# Patient Record
Sex: MALE | Race: BLACK OR AFRICAN AMERICAN | Employment: FULL TIME | URBAN - METROPOLITAN AREA
[De-identification: names, ages, dates, MRNs, and addresses within clinical notes are randomized per-mention and may not be internally consistent; named-entity substitution may affect disease eponyms.]

---

## 2018-09-13 ENCOUNTER — HOSPITAL ENCOUNTER (EMERGENCY)
Age: 53
Discharge: HOME | End: 2018-09-13
Attending: EMERGENCY MEDICINE
Payer: MEDICAID

## 2018-09-13 ENCOUNTER — APPOINTMENT (OUTPATIENT)
Dept: GENERAL RADIOLOGY | Age: 53
End: 2018-09-13
Payer: MEDICAID

## 2018-09-13 ENCOUNTER — APPOINTMENT (OUTPATIENT)
Dept: CT IMAGING | Age: 53
End: 2018-09-13
Payer: MEDICAID

## 2018-09-13 VITALS
TEMPERATURE: 98 F | SYSTOLIC BLOOD PRESSURE: 110 MMHG | DIASTOLIC BLOOD PRESSURE: 79 MMHG | RESPIRATION RATE: 18 BRPM | OXYGEN SATURATION: 100 % | HEART RATE: 70 BPM

## 2018-09-13 DIAGNOSIS — R07.9 CHEST PAIN, UNSPECIFIED TYPE: Primary | ICD-10-CM

## 2018-09-13 LAB
ANION GAP SERPL CALCULATED.3IONS-SCNC: 12 MMOL/L (ref 3–16)
ANION GAP SERPL CALCULATED.3IONS-SCNC: 7 MMOL/L (ref 3–16)
BASOPHILS ABSOLUTE: 0.1 K/UL (ref 0–0.2)
BASOPHILS RELATIVE PERCENT: 0.7 %
BUN BLDV-MCNC: 17 MG/DL (ref 7–20)
BUN BLDV-MCNC: 17 MG/DL (ref 7–20)
CALCIUM SERPL-MCNC: 9.5 MG/DL (ref 8.3–10.6)
CALCIUM SERPL-MCNC: 9.5 MG/DL (ref 8.3–10.6)
CHLORIDE BLD-SCNC: 100 MMOL/L (ref 99–110)
CHLORIDE BLD-SCNC: 103 MMOL/L (ref 99–110)
CO2: 23 MMOL/L (ref 21–32)
CO2: 25 MMOL/L (ref 21–32)
CREAT SERPL-MCNC: 0.7 MG/DL (ref 0.9–1.3)
CREAT SERPL-MCNC: 0.8 MG/DL (ref 0.9–1.3)
EOSINOPHILS ABSOLUTE: 0.1 K/UL (ref 0–0.6)
EOSINOPHILS RELATIVE PERCENT: 1 %
GFR AFRICAN AMERICAN: >60
GFR AFRICAN AMERICAN: >60
GFR NON-AFRICAN AMERICAN: >60
GFR NON-AFRICAN AMERICAN: >60
GLUCOSE BLD-MCNC: 107 MG/DL (ref 70–99)
GLUCOSE BLD-MCNC: 97 MG/DL (ref 70–99)
HCT VFR BLD CALC: 47.4 % (ref 40.5–52.5)
HEMOGLOBIN: 15.9 G/DL (ref 13.5–17.5)
LYMPHOCYTES ABSOLUTE: 2.7 K/UL (ref 1–5.1)
LYMPHOCYTES RELATIVE PERCENT: 21.9 %
MCH RBC QN AUTO: 31 PG (ref 26–34)
MCHC RBC AUTO-ENTMCNC: 33.5 G/DL (ref 31–36)
MCV RBC AUTO: 92.7 FL (ref 80–100)
MONOCYTES ABSOLUTE: 0.9 K/UL (ref 0–1.3)
MONOCYTES RELATIVE PERCENT: 7.4 %
NEUTROPHILS ABSOLUTE: 8.5 K/UL (ref 1.7–7.7)
NEUTROPHILS RELATIVE PERCENT: 69 %
PDW BLD-RTO: 14.4 % (ref 12.4–15.4)
PLATELET # BLD: 251 K/UL (ref 135–450)
PMV BLD AUTO: 8.1 FL (ref 5–10.5)
POTASSIUM REFLEX MAGNESIUM: >10 MMOL/L (ref 3.5–5.1)
POTASSIUM SERPL-SCNC: 5.3 MMOL/L (ref 3.5–5.1)
PRO-BNP: 19 PG/ML (ref 0–124)
RBC # BLD: 5.12 M/UL (ref 4.2–5.9)
SODIUM BLD-SCNC: 132 MMOL/L (ref 136–145)
SODIUM BLD-SCNC: 138 MMOL/L (ref 136–145)
TROPONIN: <0.01 NG/ML
TROPONIN: <0.01 NG/ML
WBC # BLD: 12.3 K/UL (ref 4–11)

## 2018-09-13 PROCEDURE — 99285 EMERGENCY DEPT VISIT HI MDM: CPT

## 2018-09-13 PROCEDURE — 96374 THER/PROPH/DIAG INJ IV PUSH: CPT

## 2018-09-13 PROCEDURE — 93005 ELECTROCARDIOGRAM TRACING: CPT | Performed by: EMERGENCY MEDICINE

## 2018-09-13 PROCEDURE — 93971 EXTREMITY STUDY: CPT

## 2018-09-13 PROCEDURE — 71275 CT ANGIOGRAPHY CHEST: CPT

## 2018-09-13 PROCEDURE — 83880 ASSAY OF NATRIURETIC PEPTIDE: CPT

## 2018-09-13 PROCEDURE — 71046 X-RAY EXAM CHEST 2 VIEWS: CPT

## 2018-09-13 PROCEDURE — 85025 COMPLETE CBC W/AUTO DIFF WBC: CPT

## 2018-09-13 PROCEDURE — 80048 BASIC METABOLIC PNL TOTAL CA: CPT

## 2018-09-13 PROCEDURE — 6360000004 HC RX CONTRAST MEDICATION: Performed by: EMERGENCY MEDICINE

## 2018-09-13 PROCEDURE — 84484 ASSAY OF TROPONIN QUANT: CPT

## 2018-09-13 PROCEDURE — 6360000002 HC RX W HCPCS: Performed by: PHYSICIAN ASSISTANT

## 2018-09-13 PROCEDURE — 6370000000 HC RX 637 (ALT 250 FOR IP): Performed by: PHYSICIAN ASSISTANT

## 2018-09-13 RX ORDER — LITHIUM CARBONATE 300 MG/1
300 CAPSULE ORAL
COMMUNITY
End: 2020-05-09 | Stop reason: ALTCHOICE

## 2018-09-13 RX ORDER — RISPERIDONE 2 MG/1
2 TABLET, FILM COATED ORAL NIGHTLY
COMMUNITY
End: 2020-05-09 | Stop reason: ALTCHOICE

## 2018-09-13 RX ORDER — ASPIRIN 81 MG/1
324 TABLET, CHEWABLE ORAL ONCE
Status: COMPLETED | OUTPATIENT
Start: 2018-09-13 | End: 2018-09-13

## 2018-09-13 RX ORDER — MORPHINE SULFATE 4 MG/ML
4 INJECTION, SOLUTION INTRAMUSCULAR; INTRAVENOUS ONCE
Status: COMPLETED | OUTPATIENT
Start: 2018-09-13 | End: 2018-09-13

## 2018-09-13 RX ORDER — GABAPENTIN 800 MG/1
800 TABLET ORAL 3 TIMES DAILY
COMMUNITY

## 2018-09-13 RX ORDER — NITROGLYCERIN 0.4 MG/1
0.4 TABLET SUBLINGUAL EVERY 5 MIN PRN
Status: DISCONTINUED | OUTPATIENT
Start: 2018-09-13 | End: 2018-09-13 | Stop reason: HOSPADM

## 2018-09-13 RX ADMIN — ASPIRIN 81 MG 324 MG: 81 TABLET ORAL at 10:17

## 2018-09-13 RX ADMIN — MORPHINE SULFATE 4 MG: 4 INJECTION INTRAVENOUS at 12:17

## 2018-09-13 RX ADMIN — IOPAMIDOL 80 ML: 755 INJECTION, SOLUTION INTRAVENOUS at 13:37

## 2018-09-13 RX ADMIN — NITROGLYCERIN 0.4 MG: 0.4 TABLET, ORALLY DISINTEGRATING SUBLINGUAL at 10:17

## 2018-09-13 ASSESSMENT — PAIN SCALES - GENERAL: PAINLEVEL_OUTOF10: 7

## 2018-09-13 ASSESSMENT — PAIN DESCRIPTION - FREQUENCY: FREQUENCY: CONTINUOUS

## 2018-09-13 ASSESSMENT — ENCOUNTER SYMPTOMS
COUGH: 0
ABDOMINAL PAIN: 0
VOMITING: 0
EYE REDNESS: 0
SHORTNESS OF BREATH: 1
NAUSEA: 0

## 2018-09-13 ASSESSMENT — HEART SCORE: ECG: 0

## 2018-09-13 ASSESSMENT — PAIN DESCRIPTION - DESCRIPTORS: DESCRIPTORS: SQUEEZING

## 2018-09-13 ASSESSMENT — PAIN DESCRIPTION - PAIN TYPE: TYPE: ACUTE PAIN

## 2018-09-13 ASSESSMENT — PAIN DESCRIPTION - LOCATION: LOCATION: CHEST

## 2018-09-13 NOTE — ED PROVIDER NOTES
Potassium 5.3 (H) 3.5 - 5.1 mmol/L    Chloride 103 99 - 110 mmol/L    CO2 23 21 - 32 mmol/L    Anion Gap 12 3 - 16    Glucose 97 70 - 99 mg/dL    BUN 17 7 - 20 mg/dL    CREATININE 0.7 (L) 0.9 - 1.3 mg/dL    GFR Non-African American >60 >60    GFR African American >60 >60    Calcium 9.5 8.3 - 10.6 mg/dL   Troponin   Result Value Ref Range    Troponin <0.01 <0.01 ng/mL   EKG 12 Lead   Result Value Ref Range    Ventricular Rate 77 BPM    Atrial Rate 77 BPM    P-R Interval 152 ms    QRS Duration 98 ms    Q-T Interval 396 ms    QTc Calculation (Bazett) 448 ms    P Axis 51 degrees    R Axis -39 degrees    T Axis 61 degrees    Diagnosis       EKG performed in ER and to be interpreted by ER physician. Confirmed by MD, ER ([id number]),  Shonda Estella ([id number]) on 9/13/2018 10:46:17 AM       RECENT VITALS:  BP: 110/79, Temp: 98 °F (36.7 °C), Pulse: 70, Resp: 18, SpO2: 100 %       ED Course     Nursing Notes, Past Medical Hx, Past Surgical Hx, Social Hx, Allergies, and Family Hx were reviewed. The patient was given the following medications:  Orders Placed This Encounter   Medications    aspirin chewable tablet 324 mg    nitroGLYCERIN (NITROSTAT) SL tablet 0.4 mg    morphine (PF) injection 4 mg    iopamidol (ISOVUE-370) 76 % injection 80 mL       CONSULTS:  KALANI Oneil 55 / ASSESSMENT / Oly Brownevan is a 47 y.o. male with PMH of CAD, Seizure disorder, Tobacco Use who presents with Chest pain. Patient reports constant left-sided chest pressure for the past 4 days. He reports associated shortness of breath, diaphoresis. He also reports left foot surgery by podiatry in May 2018. He reports a \"knot\" in his left lower leg for which he is scheduled for a venous duplex 9/18. Physical exam reveals a 80-year-old male in no acute distress. Heart rhythm and rate are regular without murmurs. Lungs clear to auscultation bilaterally. Chest is tender to palpation. Abdomen is soft and nontender. No calf tenderness. Mild edema to left foot. Patient does have palpable nodule to distal aspect of lateral left shin. This is not consistent with classic ACS symptoms. Heart score 2-3 (given age, risk factors). He does have pleuritic chest pain, tachycardia, recent surgery and immbolization and, therefore, PE workup pursued. Patient given 324mg ASA and nitro without relief. He is given IV morphine. EKG obtained shows normal sinus rhythm, left axis deviation without acute ST wave changes. IV access was established. Labs including CBC, BMP, Trop, BNP were obtained. Labs revealed wbc 12.3, creatinine 0.8. Negative troponin. BNP 19.    Chest Xray showed no acute abnormality. CT PA showed no PE. Ground-glass opacities may be secondary to underlying infection. However, patient has no cough, no fever. Ultrasound left lower extremity showed There is acute totally occluding superficial venous thrombosis involving the greater saphenous vein from the knee to the proximal to mid calf area. Case was discussed with on call provider for patient's PCP [facility]. Patient has superficial venous thrombosis; therefore, we will not anticoagulate. Patient should have follow-up ultrasound in 2 weeks and he will be instructed on taking aspirin daily. Additionally, he is instructed to follow-up with his PCP for outpatient stress testing. Patient discharged home with above instructions. He is given strict return precautions. This patient was also evaluated by the attending physician. All care plans were discussed and agreed upon. Clinical Impression     1.  Chest pain, unspecified type        Disposition     PATIENT REFERRED TO:  The [address] [place] [address] Emergency Department  [address] [place] [address]  [phone]    If symptoms worsen      DISCHARGE MEDICATIONS:  Discharge Medication List as of 9/13/2018  4:00 PM          DISPOSITION  Decision to discharge Jose Oakes PA-C  09/13/18 [id number]

## 2018-09-25 LAB
EKG ATRIAL RATE: 77 BPM
EKG DIAGNOSIS: NORMAL
EKG P AXIS: 51 DEGREES
EKG P-R INTERVAL: 152 MS
EKG Q-T INTERVAL: 396 MS
EKG QRS DURATION: 98 MS
EKG QTC CALCULATION (BAZETT): 448 MS
EKG R AXIS: -39 DEGREES
EKG T AXIS: 61 DEGREES
EKG VENTRICULAR RATE: 77 BPM

## 2020-05-09 ENCOUNTER — APPOINTMENT (OUTPATIENT)
Dept: CT IMAGING | Age: 55
DRG: 053 | End: 2020-05-09
Payer: MEDICAID

## 2020-05-09 ENCOUNTER — APPOINTMENT (OUTPATIENT)
Dept: GENERAL RADIOLOGY | Age: 55
DRG: 053 | End: 2020-05-09
Payer: MEDICAID

## 2020-05-09 ENCOUNTER — HOSPITAL ENCOUNTER (INPATIENT)
Age: 55
LOS: 3 days | Discharge: HOME | DRG: 053 | End: 2020-05-12
Attending: EMERGENCY MEDICINE | Admitting: INTERNAL MEDICINE
Payer: MEDICAID

## 2020-05-09 ENCOUNTER — APPOINTMENT (OUTPATIENT)
Dept: MRI IMAGING | Age: 55
DRG: 053 | End: 2020-05-09
Payer: MEDICAID

## 2020-05-09 PROBLEM — R56.9 SEIZURE (HCC): Status: ACTIVE | Noted: 2020-05-09

## 2020-05-09 LAB
AMPHETAMINE SCREEN, URINE: ABNORMAL
ANION GAP SERPL CALCULATED.3IONS-SCNC: 12 MMOL/L (ref 3–16)
ANION GAP SERPL CALCULATED.3IONS-SCNC: 16 MMOL/L (ref 3–16)
BARBITURATE SCREEN URINE: ABNORMAL
BASE EXCESS VENOUS: 0.1 MMOL/L (ref -2–3)
BASOPHILS ABSOLUTE: 0 K/UL (ref 0–0.2)
BASOPHILS RELATIVE PERCENT: 0.5 %
BENZODIAZEPINE SCREEN, URINE: ABNORMAL
BILIRUBIN URINE: NEGATIVE
BLOOD, URINE: NEGATIVE
BUN BLDV-MCNC: 15 MG/DL (ref 7–20)
BUN BLDV-MCNC: 16 MG/DL (ref 7–20)
CALCIUM SERPL-MCNC: 9.1 MG/DL (ref 8.3–10.6)
CALCIUM SERPL-MCNC: 9.5 MG/DL (ref 8.3–10.6)
CANNABINOID SCREEN URINE: POSITIVE
CARBOXYHEMOGLOBIN: 5.4 % (ref 0–1.5)
CHLORIDE BLD-SCNC: 102 MMOL/L (ref 99–110)
CHLORIDE BLD-SCNC: 105 MMOL/L (ref 99–110)
CLARITY: CLEAR
CO2: 22 MMOL/L (ref 21–32)
CO2: 23 MMOL/L (ref 21–32)
COCAINE METABOLITE SCREEN URINE: ABNORMAL
COLOR: YELLOW
CREAT SERPL-MCNC: 0.6 MG/DL (ref 0.9–1.3)
CREAT SERPL-MCNC: 0.7 MG/DL (ref 0.9–1.3)
EKG ATRIAL RATE: 65 BPM
EKG DIAGNOSIS: NORMAL
EKG P AXIS: 50 DEGREES
EKG P-R INTERVAL: 160 MS
EKG Q-T INTERVAL: 410 MS
EKG QRS DURATION: 98 MS
EKG QTC CALCULATION (BAZETT): 426 MS
EKG R AXIS: -24 DEGREES
EKG T AXIS: 54 DEGREES
EKG VENTRICULAR RATE: 65 BPM
EOSINOPHILS ABSOLUTE: 0.1 K/UL (ref 0–0.6)
EOSINOPHILS RELATIVE PERCENT: 0.5 %
ESTIMATED AVERAGE GLUCOSE: 119.8 MG/DL
ETHANOL: NORMAL MG/DL (ref 0–0.08)
GFR AFRICAN AMERICAN: >60
GFR AFRICAN AMERICAN: >60
GFR NON-AFRICAN AMERICAN: >60
GFR NON-AFRICAN AMERICAN: >60
GLUCOSE BLD-MCNC: 100 MG/DL (ref 70–99)
GLUCOSE BLD-MCNC: 113 MG/DL (ref 70–99)
GLUCOSE URINE: NEGATIVE MG/DL
HBA1C MFR BLD: 5.8 %
HCO3 VENOUS: 24.8 MMOL/L (ref 24–28)
HCT VFR BLD CALC: 45.2 % (ref 40.5–52.5)
HCT VFR BLD CALC: 45.4 % (ref 40.5–52.5)
HEMOGLOBIN, VEN, REDUCED: 7.2 %
HEMOGLOBIN: 15.3 G/DL (ref 13.5–17.5)
HEMOGLOBIN: 15.6 G/DL (ref 13.5–17.5)
KETONES, URINE: ABNORMAL MG/DL
LACTIC ACID, SEPSIS: 1.6 MMOL/L (ref 0.4–1.9)
LACTIC ACID: 0.9 MMOL/L (ref 0.4–2)
LEUKOCYTE ESTERASE, URINE: NEGATIVE
LYMPHOCYTES ABSOLUTE: 1.6 K/UL (ref 1–5.1)
LYMPHOCYTES RELATIVE PERCENT: 16.8 %
Lab: ABNORMAL
MCH RBC QN AUTO: 31.8 PG (ref 26–34)
MCH RBC QN AUTO: 31.8 PG (ref 26–34)
MCHC RBC AUTO-ENTMCNC: 33.9 G/DL (ref 31–36)
MCHC RBC AUTO-ENTMCNC: 34.3 G/DL (ref 31–36)
MCV RBC AUTO: 92.7 FL (ref 80–100)
MCV RBC AUTO: 94 FL (ref 80–100)
METHADONE SCREEN, URINE: ABNORMAL
METHEMOGLOBIN VENOUS: 0.4 % (ref 0–1.5)
MICROSCOPIC EXAMINATION: ABNORMAL
MONOCYTES ABSOLUTE: 0.7 K/UL (ref 0–1.3)
MONOCYTES RELATIVE PERCENT: 7.6 %
NEUTROPHILS ABSOLUTE: 7.1 K/UL (ref 1.7–7.7)
NEUTROPHILS RELATIVE PERCENT: 74.6 %
NITRITE, URINE: NEGATIVE
O2 SAT, VEN: 92 %
OPIATE SCREEN URINE: ABNORMAL
OXYCODONE URINE: ABNORMAL
PCO2, VEN: 42.4 MMHG (ref 41–51)
PDW BLD-RTO: 14.1 % (ref 12.4–15.4)
PDW BLD-RTO: 14.2 % (ref 12.4–15.4)
PH UA: 6
PH UA: 6 (ref 5–8)
PH VENOUS: 7.38 (ref 7.35–7.45)
PHENCYCLIDINE SCREEN URINE: ABNORMAL
PHENYTOIN DOSE AMOUNT: ABNORMAL
PHENYTOIN LEVEL: 24.4 UG/ML (ref 10–20)
PLATELET # BLD: 193 K/UL (ref 135–450)
PLATELET # BLD: 217 K/UL (ref 135–450)
PMV BLD AUTO: 7.7 FL (ref 5–10.5)
PMV BLD AUTO: 8.2 FL (ref 5–10.5)
PO2, VEN: 73.4 MMHG (ref 25–40)
POTASSIUM REFLEX MAGNESIUM: 3.8 MMOL/L (ref 3.5–5.1)
POTASSIUM REFLEX MAGNESIUM: 4.1 MMOL/L (ref 3.5–5.1)
PROPOXYPHENE SCREEN: ABNORMAL
PROTEIN UA: NEGATIVE MG/DL
RBC # BLD: 4.81 M/UL (ref 4.2–5.9)
RBC # BLD: 4.9 M/UL (ref 4.2–5.9)
SODIUM BLD-SCNC: 139 MMOL/L (ref 136–145)
SODIUM BLD-SCNC: 141 MMOL/L (ref 136–145)
SPECIFIC GRAVITY UA: >=1.03 (ref 1–1.03)
TCO2 CALC VENOUS: 26 MMOL/L
TOTAL CK: 399 U/L (ref 39–308)
TROPONIN: <0.01 NG/ML
TSH REFLEX: 1.97 UIU/ML (ref 0.27–4.2)
URINE TYPE: ABNORMAL
UROBILINOGEN, URINE: 0.2 E.U./DL
WBC # BLD: 9.4 K/UL (ref 4–11)
WBC # BLD: 9.6 K/UL (ref 4–11)

## 2020-05-09 PROCEDURE — 6360000002 HC RX W HCPCS

## 2020-05-09 PROCEDURE — 70553 MRI BRAIN STEM W/O & W/DYE: CPT

## 2020-05-09 PROCEDURE — 84484 ASSAY OF TROPONIN QUANT: CPT

## 2020-05-09 PROCEDURE — 83036 HEMOGLOBIN GLYCOSYLATED A1C: CPT

## 2020-05-09 PROCEDURE — 81003 URINALYSIS AUTO W/O SCOPE: CPT

## 2020-05-09 PROCEDURE — 99285 EMERGENCY DEPT VISIT HI MDM: CPT

## 2020-05-09 PROCEDURE — 2580000003 HC RX 258: Performed by: STUDENT IN AN ORGANIZED HEALTH CARE EDUCATION/TRAINING PROGRAM

## 2020-05-09 PROCEDURE — 72125 CT NECK SPINE W/O DYE: CPT

## 2020-05-09 PROCEDURE — 70450 CT HEAD/BRAIN W/O DYE: CPT

## 2020-05-09 PROCEDURE — 2580000003 HC RX 258: Performed by: PHYSICIAN ASSISTANT

## 2020-05-09 PROCEDURE — 96374 THER/PROPH/DIAG INJ IV PUSH: CPT

## 2020-05-09 PROCEDURE — 93005 ELECTROCARDIOGRAM TRACING: CPT | Performed by: STUDENT IN AN ORGANIZED HEALTH CARE EDUCATION/TRAINING PROGRAM

## 2020-05-09 PROCEDURE — 6360000002 HC RX W HCPCS: Performed by: STUDENT IN AN ORGANIZED HEALTH CARE EDUCATION/TRAINING PROGRAM

## 2020-05-09 PROCEDURE — 80307 DRUG TEST PRSMV CHEM ANLYZR: CPT

## 2020-05-09 PROCEDURE — 6360000004 HC RX CONTRAST MEDICATION: Performed by: STUDENT IN AN ORGANIZED HEALTH CARE EDUCATION/TRAINING PROGRAM

## 2020-05-09 PROCEDURE — 82803 BLOOD GASES ANY COMBINATION: CPT

## 2020-05-09 PROCEDURE — 71046 X-RAY EXAM CHEST 2 VIEWS: CPT

## 2020-05-09 PROCEDURE — 82550 ASSAY OF CK (CPK): CPT

## 2020-05-09 PROCEDURE — G0480 DRUG TEST DEF 1-7 CLASSES: HCPCS

## 2020-05-09 PROCEDURE — 6370000000 HC RX 637 (ALT 250 FOR IP): Performed by: STUDENT IN AN ORGANIZED HEALTH CARE EDUCATION/TRAINING PROGRAM

## 2020-05-09 PROCEDURE — 2060000000 HC ICU INTERMEDIATE R&B

## 2020-05-09 PROCEDURE — 0HQ1XZZ REPAIR FACE SKIN, EXTERNAL APPROACH: ICD-10-PCS | Performed by: PHYSICIAN ASSISTANT

## 2020-05-09 PROCEDURE — 80048 BASIC METABOLIC PNL TOTAL CA: CPT

## 2020-05-09 PROCEDURE — 85027 COMPLETE CBC AUTOMATED: CPT

## 2020-05-09 PROCEDURE — 73030 X-RAY EXAM OF SHOULDER: CPT

## 2020-05-09 PROCEDURE — 96375 TX/PRO/DX INJ NEW DRUG ADDON: CPT

## 2020-05-09 PROCEDURE — 83605 ASSAY OF LACTIC ACID: CPT

## 2020-05-09 PROCEDURE — A9579 GAD-BASE MR CONTRAST NOS,1ML: HCPCS | Performed by: STUDENT IN AN ORGANIZED HEALTH CARE EDUCATION/TRAINING PROGRAM

## 2020-05-09 PROCEDURE — 6360000002 HC RX W HCPCS: Performed by: PHYSICIAN ASSISTANT

## 2020-05-09 PROCEDURE — 6370000000 HC RX 637 (ALT 250 FOR IP): Performed by: PHYSICIAN ASSISTANT

## 2020-05-09 PROCEDURE — 12011 RPR F/E/E/N/L/M 2.5 CM/<: CPT

## 2020-05-09 PROCEDURE — 80185 ASSAY OF PHENYTOIN TOTAL: CPT

## 2020-05-09 PROCEDURE — 36415 COLL VENOUS BLD VENIPUNCTURE: CPT

## 2020-05-09 PROCEDURE — 85025 COMPLETE CBC W/AUTO DIFF WBC: CPT

## 2020-05-09 PROCEDURE — 2500000003 HC RX 250 WO HCPCS: Performed by: PHYSICIAN ASSISTANT

## 2020-05-09 PROCEDURE — 71045 X-RAY EXAM CHEST 1 VIEW: CPT

## 2020-05-09 PROCEDURE — 84443 ASSAY THYROID STIM HORMONE: CPT

## 2020-05-09 PROCEDURE — 95813 EEG EXTND MNTR 61-119 MIN: CPT

## 2020-05-09 PROCEDURE — 93005 ELECTROCARDIOGRAM TRACING: CPT | Performed by: PHYSICIAN ASSISTANT

## 2020-05-09 RX ORDER — ACETAMINOPHEN 325 MG/1
650 TABLET ORAL EVERY 6 HOURS PRN
Status: DISCONTINUED | OUTPATIENT
Start: 2020-05-09 | End: 2020-05-12 | Stop reason: HOSPADM

## 2020-05-09 RX ORDER — ASPIRIN 81 MG/1
81 TABLET, CHEWABLE ORAL DAILY
Status: DISCONTINUED | OUTPATIENT
Start: 2020-05-09 | End: 2020-05-12 | Stop reason: HOSPADM

## 2020-05-09 RX ORDER — OLANZAPINE 10 MG/1
10 TABLET ORAL NIGHTLY
Status: ON HOLD | COMMUNITY
End: 2020-05-11 | Stop reason: ALTCHOICE

## 2020-05-09 RX ORDER — SODIUM CHLORIDE 0.9 % (FLUSH) 0.9 %
10 SYRINGE (ML) INJECTION PRN
Status: DISCONTINUED | OUTPATIENT
Start: 2020-05-09 | End: 2020-05-12 | Stop reason: HOSPADM

## 2020-05-09 RX ORDER — 0.9 % SODIUM CHLORIDE 0.9 %
500 INTRAVENOUS SOLUTION INTRAVENOUS ONCE
Status: COMPLETED | OUTPATIENT
Start: 2020-05-09 | End: 2020-05-09

## 2020-05-09 RX ORDER — PROMETHAZINE HYDROCHLORIDE 12.5 MG/1
12.5 TABLET ORAL EVERY 6 HOURS PRN
Status: DISCONTINUED | OUTPATIENT
Start: 2020-05-09 | End: 2020-05-12 | Stop reason: HOSPADM

## 2020-05-09 RX ORDER — SODIUM CHLORIDE 9 MG/ML
INJECTION, SOLUTION INTRAVENOUS CONTINUOUS
Status: DISCONTINUED | OUTPATIENT
Start: 2020-05-09 | End: 2020-05-10

## 2020-05-09 RX ORDER — LORAZEPAM 2 MG/ML
2 INJECTION INTRAMUSCULAR EVERY 5 MIN PRN
Status: DISCONTINUED | OUTPATIENT
Start: 2020-05-09 | End: 2020-05-12 | Stop reason: HOSPADM

## 2020-05-09 RX ORDER — RISPERIDONE 2 MG/1
2 TABLET, FILM COATED ORAL DAILY
Status: ON HOLD | COMMUNITY
End: 2020-05-11 | Stop reason: ALTCHOICE

## 2020-05-09 RX ORDER — HYDROCODONE BITARTRATE AND ACETAMINOPHEN 5; 325 MG/1; MG/1
1 TABLET ORAL ONCE
Status: DISCONTINUED | OUTPATIENT
Start: 2020-05-09 | End: 2020-05-09

## 2020-05-09 RX ORDER — LORAZEPAM 2 MG/ML
INJECTION INTRAMUSCULAR
Status: DISPENSED
Start: 2020-05-09 | End: 2020-05-09

## 2020-05-09 RX ORDER — POLYETHYLENE GLYCOL 3350 17 G/17G
17 POWDER, FOR SOLUTION ORAL DAILY PRN
Status: DISCONTINUED | OUTPATIENT
Start: 2020-05-09 | End: 2020-05-12 | Stop reason: HOSPADM

## 2020-05-09 RX ORDER — DIPHENHYDRAMINE HYDROCHLORIDE 50 MG/ML
25 INJECTION INTRAMUSCULAR; INTRAVENOUS ONCE
Status: COMPLETED | OUTPATIENT
Start: 2020-05-09 | End: 2020-05-09

## 2020-05-09 RX ORDER — SODIUM CHLORIDE 0.9 % (FLUSH) 0.9 %
10 SYRINGE (ML) INJECTION EVERY 12 HOURS SCHEDULED
Status: DISCONTINUED | OUTPATIENT
Start: 2020-05-09 | End: 2020-05-12 | Stop reason: HOSPADM

## 2020-05-09 RX ORDER — ACETAMINOPHEN 650 MG/1
650 SUPPOSITORY RECTAL EVERY 6 HOURS PRN
Status: DISCONTINUED | OUTPATIENT
Start: 2020-05-09 | End: 2020-05-12 | Stop reason: HOSPADM

## 2020-05-09 RX ORDER — LORAZEPAM 2 MG/ML
2 INJECTION INTRAMUSCULAR
Status: COMPLETED | OUTPATIENT
Start: 2020-05-09 | End: 2020-05-09

## 2020-05-09 RX ORDER — GABAPENTIN 400 MG/1
800 CAPSULE ORAL 3 TIMES DAILY
Status: DISCONTINUED | OUTPATIENT
Start: 2020-05-09 | End: 2020-05-12 | Stop reason: HOSPADM

## 2020-05-09 RX ORDER — RISPERIDONE 1 MG/1
2 TABLET, FILM COATED ORAL DAILY
Status: DISCONTINUED | OUTPATIENT
Start: 2020-05-09 | End: 2020-05-12

## 2020-05-09 RX ORDER — LORAZEPAM 2 MG/ML
INJECTION INTRAMUSCULAR
Status: DISCONTINUED
Start: 2020-05-09 | End: 2020-05-09

## 2020-05-09 RX ORDER — BACITRACIN ZINC AND POLYMYXIN B SULFATE 500; 1000 [USP'U]/G; [USP'U]/G
OINTMENT TOPICAL ONCE
Status: COMPLETED | OUTPATIENT
Start: 2020-05-09 | End: 2020-05-09

## 2020-05-09 RX ORDER — LITHIUM CARBONATE 300 MG
300 TABLET ORAL 3 TIMES DAILY
Status: ON HOLD | COMMUNITY
End: 2020-05-11 | Stop reason: ALTCHOICE

## 2020-05-09 RX ORDER — DIPHENHYDRAMINE HYDROCHLORIDE 50 MG/ML
INJECTION INTRAMUSCULAR; INTRAVENOUS
Status: COMPLETED
Start: 2020-05-09 | End: 2020-05-09

## 2020-05-09 RX ORDER — ONDANSETRON 2 MG/ML
4 INJECTION INTRAMUSCULAR; INTRAVENOUS EVERY 6 HOURS PRN
Status: DISCONTINUED | OUTPATIENT
Start: 2020-05-09 | End: 2020-05-12 | Stop reason: HOSPADM

## 2020-05-09 RX ORDER — LORAZEPAM 2 MG/ML
2 INJECTION INTRAMUSCULAR ONCE
Status: DISCONTINUED | OUTPATIENT
Start: 2020-05-09 | End: 2020-05-09

## 2020-05-09 RX ORDER — LITHIUM CARBONATE 300 MG
300 TABLET ORAL 3 TIMES DAILY
Status: DISCONTINUED | OUTPATIENT
Start: 2020-05-09 | End: 2020-05-12

## 2020-05-09 RX ORDER — MORPHINE SULFATE 4 MG/ML
4 INJECTION, SOLUTION INTRAMUSCULAR; INTRAVENOUS ONCE
Status: COMPLETED | OUTPATIENT
Start: 2020-05-09 | End: 2020-05-09

## 2020-05-09 RX ORDER — OLANZAPINE 5 MG/1
10 TABLET ORAL NIGHTLY
Status: DISCONTINUED | OUTPATIENT
Start: 2020-05-09 | End: 2020-05-12 | Stop reason: HOSPADM

## 2020-05-09 RX ORDER — MELOXICAM 15 MG/1
15 TABLET ORAL DAILY PRN
COMMUNITY
End: 2021-08-19 | Stop reason: ALTCHOICE

## 2020-05-09 RX ORDER — LORAZEPAM 2 MG/ML
1 INJECTION INTRAMUSCULAR ONCE
Status: COMPLETED | OUTPATIENT
Start: 2020-05-09 | End: 2020-05-09

## 2020-05-09 RX ORDER — LIDOCAINE HYDROCHLORIDE AND EPINEPHRINE 10; 10 MG/ML; UG/ML
20 INJECTION, SOLUTION INFILTRATION; PERINEURAL ONCE
Status: COMPLETED | OUTPATIENT
Start: 2020-05-09 | End: 2020-05-09

## 2020-05-09 RX ORDER — ONDANSETRON 2 MG/ML
4 INJECTION INTRAMUSCULAR; INTRAVENOUS ONCE
Status: DISCONTINUED | OUTPATIENT
Start: 2020-05-09 | End: 2020-05-09

## 2020-05-09 RX ORDER — AMMONIUM LACTATE 12 G/100G
LOTION TOPICAL PRN
COMMUNITY
End: 2021-08-19 | Stop reason: ALTCHOICE

## 2020-05-09 RX ADMIN — ACETAMINOPHEN 650 MG: 325 TABLET ORAL at 09:29

## 2020-05-09 RX ADMIN — LEVETIRACETAM 1000 MG: 100 INJECTION, SOLUTION INTRAVENOUS at 23:28

## 2020-05-09 RX ADMIN — Medication 10 ML: at 09:07

## 2020-05-09 RX ADMIN — SODIUM CHLORIDE: 9 INJECTION, SOLUTION INTRAVENOUS at 21:38

## 2020-05-09 RX ADMIN — LITHIUM CARBONATE 300 MG: 300 TABLET ORAL at 23:37

## 2020-05-09 RX ADMIN — LIDOCAINE HYDROCHLORIDE,EPINEPHRINE BITARTRATE 20 ML: 10; .01 INJECTION, SOLUTION INFILTRATION; PERINEURAL at 01:00

## 2020-05-09 RX ADMIN — Medication 10 ML: at 21:33

## 2020-05-09 RX ADMIN — SODIUM CHLORIDE 500 ML: 9 INJECTION, SOLUTION INTRAVENOUS at 00:58

## 2020-05-09 RX ADMIN — LEVETIRACETAM 1000 MG: 100 INJECTION, SOLUTION INTRAVENOUS at 12:16

## 2020-05-09 RX ADMIN — ASPIRIN 81 MG 81 MG: 81 TABLET ORAL at 09:07

## 2020-05-09 RX ADMIN — DIPHENHYDRAMINE HYDROCHLORIDE 25 MG: 50 INJECTION INTRAMUSCULAR; INTRAVENOUS at 03:10

## 2020-05-09 RX ADMIN — OLANZAPINE 10 MG: 5 TABLET, FILM COATED ORAL at 23:37

## 2020-05-09 RX ADMIN — MORPHINE SULFATE 4 MG: 4 INJECTION INTRAVENOUS at 00:58

## 2020-05-09 RX ADMIN — GABAPENTIN 800 MG: 400 CAPSULE ORAL at 23:37

## 2020-05-09 RX ADMIN — DEXTROSE MONOHYDRATE 100 MG PE: 50 INJECTION, SOLUTION INTRAVENOUS at 21:35

## 2020-05-09 RX ADMIN — BACITRACIN ZINC AND POLYMYXIN B SULFATE: at 02:57

## 2020-05-09 RX ADMIN — ENOXAPARIN SODIUM 40 MG: 40 INJECTION SUBCUTANEOUS at 09:07

## 2020-05-09 RX ADMIN — DEXTROSE MONOHYDRATE 1280 MG PE: 50 INJECTION, SOLUTION INTRAVENOUS at 02:57

## 2020-05-09 RX ADMIN — DEXTROSE MONOHYDRATE 100 MG PE: 50 INJECTION, SOLUTION INTRAVENOUS at 12:58

## 2020-05-09 RX ADMIN — RISPERIDONE 2 MG: 1 TABLET ORAL at 13:04

## 2020-05-09 RX ADMIN — GABAPENTIN 800 MG: 400 CAPSULE ORAL at 09:07

## 2020-05-09 RX ADMIN — LITHIUM CARBONATE 300 MG: 300 TABLET ORAL at 13:04

## 2020-05-09 RX ADMIN — GADOTERIDOL 17 ML: 279.3 INJECTION, SOLUTION INTRAVENOUS at 11:44

## 2020-05-09 RX ADMIN — SODIUM CHLORIDE: 9 INJECTION, SOLUTION INTRAVENOUS at 12:08

## 2020-05-09 RX ADMIN — LORAZEPAM 1 MG: 2 INJECTION INTRAMUSCULAR; INTRAVENOUS at 00:50

## 2020-05-09 RX ADMIN — LORAZEPAM 2 MG: 2 INJECTION INTRAMUSCULAR; INTRAVENOUS at 10:23

## 2020-05-09 ASSESSMENT — ENCOUNTER SYMPTOMS
VOMITING: 0
WHEEZING: 0
TROUBLE SWALLOWING: 0
ABDOMINAL PAIN: 0
BACK PAIN: 0
RHINORRHEA: 0
PHOTOPHOBIA: 0
GASTROINTESTINAL NEGATIVE: 1
COUGH: 0
SORE THROAT: 0
DIARRHEA: 0
SHORTNESS OF BREATH: 0
CHEST TIGHTNESS: 0
FACIAL SWELLING: 0
NAUSEA: 0
SINUS PAIN: 0

## 2020-05-09 ASSESSMENT — PAIN SCALES - GENERAL
PAINLEVEL_OUTOF10: 0
PAINLEVEL_OUTOF10: 6
PAINLEVEL_OUTOF10: 0
PAINLEVEL_OUTOF10: 5
PAINLEVEL_OUTOF10: 3

## 2020-05-09 NOTE — PROGRESS NOTES
4 Eyes Admission Assessment     I agree as the admission nurse that 2 RN's have performed a thorough Head to Toe Skin Assessment on the patient. ALL assessment sites listed below have been assessed on admission. Areas assessed by both nurses:   [x]   Head, Face, and Ears   [x]   Shoulders, Back, and Chest  [x]   Arms, Elbows, and Hands   [x]   Coccyx, Sacrum, and Ischum  [x]   Legs, Feet, and Heels        Does the Patient have Skin Breakdown? Patient has a laceration on right eye with sutures.           Manoj Prevention initiated:  No   Wound Care Orders initiated:  No      WOC nurse consulted for Pressure Injury (Stage 3,4, Unstageable, DTI, NWPT, and Complex wounds):  No      Nurse 1 eSignature: Electronically signed by Kacy Bruno RN on 5/9/20 at 5:50 AM EDT    **SHARE this note so that the co-signing nurse is able to place an eSignature**    Nurse 2 eSignature: Electronically signed by Chidi Tabares on 5/9/20 at 5:52 AM EDT

## 2020-05-09 NOTE — PROGRESS NOTES
Went to do EKG at 1010 patient was not in the room. Called the [facility] again at [phone] and the patient is still not in the room so the EKG could not be completed.

## 2020-05-09 NOTE — PROGRESS NOTES
RN called in during CT scan for patient having seizure. Patient off of CT scan table, right arm/shoulder holding patient up against machine. Left arm visibly shaking but did not appear stiff and patient moaning, episode lasted about 30-60 seconds, RN unable to assess eyes during seizure. RN immediately administered 2mg IV ativan per order for seizure and seizure stopped a few seconds after administration. Patient able to respond and answer questions appropriately immediately after episode, patient oriented to self and situation. MD notified, new orders placed.

## 2020-05-09 NOTE — CONSULTS
Neurology Consult Note  Reason for Consult:\"Witnessed seizure like activity, h/o seizures, prev on dilantin, stopped taking after 2yrs w/o Sz, seen at [facility]\"  Chief complaint:\" I've had 5 seizures in a day\"  Dr Heather Osorio MD asked me to see Ellie Arce in consultation for evaluation of \"Witnessed seizure like activity, h/o seizures, prev on dilantin, stopped taking after 2yrs w/o Sz, seen at [facility]\"    History of Present Illness: The patient is unable to provide his full history. History supplemented by chart review. Ellie Arce is a 49 y.o. male who presents to the ED on 5/9/20. He presented after having a seizure at home. Yesterday morning, patient states that he hit his head while working and \"almost knocked himself out\". He denies any loss of consciousness. He did have a small \"bumb\" on his head from where he hit his head. Late last night, the patient had a witnessed seizure at home. He sustained a laceration to his right upper face during the seizure. On arrival to the ED, noted to be alert and oriented. While in the CT scanner he had another \"seizure\". No further description if this event was described. He was reportedly postictal following the event but did improve back to his baseline while in the ED. He was loaded with 15 mg/kg fosphenytoin in the ED. He was also administered Ativan. Per bedside nursing staff, the patient fell this morning, and RN's responded after they heard the patient hit the ground. He was noted to be unresponsive and had generalized convulsions that lasted less jackeline 30 seconds. Abnormal movements ceased on their own. He quickly returned to his baseline. Given continued concern for seizures and fall, he was emergently taken to the CT scanner. While in the scanner he had another event concerning for seizure. Per RN who witnessed event, the patient was shaking his left arm, was moaning, and was not able to answer questions.  RN states that she is unsure anxiety  Endocrine- No diabetes. No thyroid issues. Hematologic- No bleeding difficulty. No fatigue  Neurologic- No weakness. + Headache. Exam: Exam completed shortly after patient received 2 mg Ativan  Blood pressure 108/70, pulse 55, temperature 97.7 °F (36.5 °C), temperature source Oral, resp. rate 16, height 6' 1\" (1.854 m), weight 188 lb (85.3 kg), SpO2 98 %. Constitutional    Vital signs: BP, HR, and RR reviewed   General Dorwsy, no distress, well-nourished  Eyes: unable to visualize the fundi , laceration and stitches above right eye  Cardiovascular: pulses symmetric in all 4 extremities. No peripheral edema. Psychiatric: cooperative with examination, no  psychotic behavior noted. Neurologic  Mental status: Eyes open spontaneously  orientation to person, place, month, tells me the year is [place] [address] of knowledge grossly intact, able to name preceding president    Memory grossly intact   Attention Poor, unable to keep eyes open without constant verbal stimulus throughout exam    Language fluent in conversation   Comprehension intact; follows simple commands  Cranial nerves:   CN2: Visual Fields full w/o extinction on confrontational testing  CN 3,4,6: Impaired upward gaze , unable to fully look to the left with left eye, otherwise EOMI, PERRL  CN5: facial sensation symmetric   CN7:face symmetric without dysarthria  CN8: hearing grossly intact  CN9: palate elevated symmetrically  CN11: trap full strength on shoulder shrug  CN12: tongue midline with protrusion  Strength:  5/5 strength in all 4 extremities   Deep tendon reflexes: 2+ BUEs, areflexic BLEs  Sensory: light touch intact in all 4 extremities. Cerebellar/coordination: finger nose finger normal without ataxia  Tone: normal in all 4 extremities  Gait: Deferred for safety      Labs  BUN: 15  Creatinine: 0.6  Glucose: 113  CK: 399  Lactate: 1.6  WBC: 9.4    UA:   Ref.  Range 5/9/2020 04:30   Nitrite, Urine Latest Ref Range: Negative

## 2020-05-09 NOTE — H&P
reviewed. Constitutional:       General: He is not in acute distress. Appearance: He is not ill-appearing or diaphoretic. HENT:      Head: Normocephalic. Comments: 4cm laceration to R eyebrow s/p lac repair, tongue abrasion     Nose: Nose normal.      Mouth/Throat:      Mouth: Mucous membranes are moist.      Pharynx: Oropharynx is clear. Eyes:      Extraocular Movements: Extraocular movements intact. Neck:      Musculoskeletal: Normal range of motion and neck supple. No muscular tenderness. Cardiovascular:      Rate and Rhythm: Normal rate and regular rhythm. Pulses: Normal pulses. Pulmonary:      Effort: Pulmonary effort is normal. No respiratory distress. Breath sounds: Normal breath sounds. No wheezing or rales. Abdominal:      General: Bowel sounds are normal. There is no distension. Palpations: Abdomen is soft. Tenderness: There is no abdominal tenderness. Musculoskeletal:         General: No swelling or tenderness. Comments: Moves all four extremities to command   Skin:     General: Skin is warm and dry. Neurological:      General: No focal deficit present. Mental Status: He is alert and oriented to person, place, and time. Cranial Nerves: No cranial nerve deficit. Labs:     Recent Labs     05/09/20 0052   WBC 9.6   HGB 15.6   HCT 45.4        Recent Labs     05/09/20 0052      K 3.8      CO2 23   BUN 16   CREATININE 0.7*   CALCIUM 9.5     No results for input(s): AST, ALT, BILIDIR, BILITOT, ALKPHOS in the last 72 hours. No results for input(s): INR in the last 72 hours.   Recent Labs     05/09/20 0052   TROPONINI <0.01       Urinalysis:   No results found for: NITRU, WBCUA, BACTERIA, RBCUA, BLOODU, SPECGRAV, GLUCOSEU  No results for input(s): NITRITE, COLORU, PHUR, LABCAST, WBCUA, RBCUA, MUCUS, TRICHOMONAS, YEAST, BACTERIA, CLARITYU, SPECGRAV, LEUKOCYTESUR, UROBILINOGEN, BILIRUBINUR, BLOODU, GLUCOSEU, AMORPHOUS in the

## 2020-05-09 NOTE — ED PROVIDER NOTES
[address] [facility] [address] ENCOUNTER          PHYSICIAN ASSISTANT NOTE       Date of evaluation: 5/9/2020    Chief Complaint     Seizures      History of Present Illness     Paula Cho is a 49 y.o. male who presents after having seizure at home. Patient has a history of seizures but has not had a seizure in over 2 years. He is not currently on any antiepileptics for this. He states he was taking Dilantin 300 mg  3 times daily but stopped this over 2 years ago after not having seizures for at least 6 months to 1 year before that. Patient states that he hit his head today when he was working downstairs and almost knocked himself out. He states he has had headache since then. He denies loss of consciousness, dizziness or syncope. Denies any nausea or vomiting. Denies any chest pain or shortness of breath. Denies any recent illness, fevers or chills, cough or congestion. Denies any recent travel or sick exposures. Patient states he was seeing neurology at [facility] but has not seen them recently. Patient states this evening he had a witnessed seizure by his family. Patient does complain of laceration to the right eyebrow from the seizure. He states the contusion to the top of his head was from his earlier head injury. Denies any neck pain, back pain, chest pain or abdominal pain or extremity injury. Does complain of headache as well as pain around the laceration. Last tetanus shot 2019. Review of Systems     Review of Systems   Constitutional: Negative for chills, diaphoresis, fatigue and fever. HENT: Negative for congestion, dental problem, facial swelling, nosebleeds, rhinorrhea, sinus pain, sore throat and trouble swallowing. Eyes: Negative for photophobia and visual disturbance. Respiratory: Negative for cough, chest tightness, shortness of breath and wheezing. Cardiovascular: Negative for chest pain, palpitations and leg swelling. Extraocular Movements: Extraocular movements intact. Conjunctiva/sclera: Conjunctivae normal.      Pupils: Pupils are equal, round, and reactive to light. Neck:      Musculoskeletal: Normal range of motion and neck supple. Normal range of motion. No pain with movement, spinous process tenderness or muscular tenderness. Trachea: Trachea normal.   Cardiovascular:      Rate and Rhythm: Normal rate and regular rhythm. Pulses: Normal pulses. Heart sounds: Normal heart sounds. Pulmonary:      Effort: Pulmonary effort is normal.      Breath sounds: Normal breath sounds. Abdominal:      General: Abdomen is flat. Bowel sounds are normal. There is no distension. Palpations: Abdomen is soft. Tenderness: There is no abdominal tenderness. There is no right CVA tenderness, left CVA tenderness or guarding. Musculoskeletal: Normal range of motion. General: No swelling or tenderness. Right lower leg: No edema. Left lower leg: No edema. Comments: Full range of motion of bilateral shoulders, elbows, wrists and hands. Full range of motion of bilateral hips, knees, ankles and feet. No spinous process tenderness of cervical, thoracic or lumbar spine. Lymphadenopathy:      Cervical: No cervical adenopathy. Skin:     General: Skin is warm and dry. Findings: No rash. Neurological:      General: No focal deficit present. Mental Status: He is alert and oriented to person, place, and time. Cranial Nerves: Cranial nerves are intact. No cranial nerve deficit. Sensory: Sensation is intact. No sensory deficit. Motor: Motor function is intact. No weakness.       Coordination: Coordination normal.         Diagnostic Results     EKG   Interpreted in conjunction with emergency department physician Jacque Madrid MD  EKG Interpretation  Rhythm: normal sinus   Rate: normal HR 65  Axis: normal  Ectopy: none  Conduction: normal  ST Segments: no acute change  T Waves: no acute change  Q Waves: none    Clinical Impression: no acute changes and normal EKG    RADIOLOGY:  XR CHEST PORTABLE   Final Result   1. No acute cardiopulmonary disease. 2.  Linear lucency projecting over the right coracoid process is favored    to represent confluence of radiographic shadows rather than a fracture. Correlate for trauma history, and with physical exam.          CT Head WO Contrast   Final Result     No acute intracranial process.               LABS:   Results for orders placed or performed during the hospital encounter of 05/09/20   CBC Auto Differential   Result Value Ref Range    WBC 9.6 4.0 - 11.0 K/uL    RBC 4.90 4.20 - 5.90 M/uL    Hemoglobin 15.6 13.5 - 17.5 g/dL    Hematocrit 45.4 40.5 - 52.5 %    MCV 92.7 80.0 - 100.0 fL    MCH 31.8 26.0 - 34.0 pg    MCHC 34.3 31.0 - 36.0 g/dL    RDW 14.1 12.4 - 15.4 %    Platelets 727 213 - 128 K/uL    MPV 8.2 5.0 - 10.5 fL    Neutrophils % 74.6 %    Lymphocytes % 16.8 %    Monocytes % 7.6 %    Eosinophils % 0.5 %    Basophils % 0.5 %    Neutrophils Absolute 7.1 1.7 - 7.7 K/uL    Lymphocytes Absolute 1.6 1.0 - 5.1 K/uL    Monocytes Absolute 0.7 0.0 - 1.3 K/uL    Eosinophils Absolute 0.1 0.0 - 0.6 K/uL    Basophils Absolute 0.0 0.0 - 0.2 K/uL   Basic Metabolic Panel w/ Reflex to MG   Result Value Ref Range    Sodium 141 136 - 145 mmol/L    Potassium reflex Magnesium 3.8 3.5 - 5.1 mmol/L    Chloride 102 99 - 110 mmol/L    CO2 23 21 - 32 mmol/L    Anion Gap 16 3 - 16    Glucose 100 (H) 70 - 99 mg/dL    BUN 16 7 - 20 mg/dL    CREATININE 0.7 (L) 0.9 - 1.3 mg/dL    GFR Non-African American >60 >60    GFR African American >60 >60    Calcium 9.5 8.3 - 10.6 mg/dL   Lactate, Sepsis   Result Value Ref Range    Lactic Acid, Sepsis 1.6 0.4 - 1.9 mmol/L   Blood gas, venous (Lab)   Result Value Ref Range    pH, Jerry 7.385 7.350 - 7.450    pCO2, Jerry 42.4 41.0 - 51.0 mmHg    pO2, Jerry 73.4 (H) 25.0 - 40.0 mmHg    HCO3, Venous 24.8 24.0 - 28.0 mmol/L    Base Ativan here. He has had no other seizure since then. Patient has remained stable condition. I did speak to the hospitalist to the patient admitted. This patient was also evaluated by the attending physician. All care plans were discussed and agreed upon. Clinical Impression     1. Seizure ([address]    2. Laceration of right eyebrow, initial encounter    3. Minor head injury, initial encounter        Disposition     PATIENT REFERRED TO:  No follow-up provider specified.     DISCHARGE MEDICATIONS:  New Prescriptions    No medications on file       DISPOSITION Decision To Admit 05/09/2020 02:00:44 AM        Myriam Castillo, [address]  05/09/20 [address], [address]  05/09/20 [id number]

## 2020-05-09 NOTE — CONSULTS
Neurosurgery Consult Note    Reason for Consult:  Requesting Physician:    Subjective:  CHIEF COMPLAINT / HPI:  Jaren Gomez is a 49 y.o. male patient being seen to evaluate C spine after CT results were obtained s/p fall. . Patient is a 71-year-old male who presented to hospital after a  Seizure and fall  at home, patient had witnessed seizure in the emergency department.  Patient also mentions he had been antiseizure medications in the past.  Patient was initially postictal, then improved.  Patient also was noted to have a 4 cm laceration on the right eyebrow. Had fall here at hospital per nursing staff. Has hx of seizure disorder. Having leads applied to scalp now.   Recently sedated with multiple doses of Ativan due to recent seizure activity.        Past Medical History:   Diagnosis Date    Acute MI ([address]     2 years ago    CAD (coronary artery disease)     Seizures (HCC)      Past Surgical History:   Procedure Laterality Date    TYMPANOSTOMY TUBE PLACEMENT       Fish-derived products and Ibuprofen    Current Facility-Administered Medications:     aspirin chewable tablet 81 mg, 81 mg, Oral, Daily, Alexia Aragon MD, 81 mg at 05/09/20 0907    gabapentin (NEURONTIN) capsule 800 mg, 800 mg, Oral, TID, Alexia Aragon MD, 800 mg at 05/09/20 0907    sodium chloride flush 0.9 % injection 10 mL, 10 mL, Intravenous, 2 times per day, Alexia Aragon MD, 10 mL at 05/09/20 0907    sodium chloride flush 0.9 % injection 10 mL, 10 mL, Intravenous, PRN, Alexia Aragon MD    acetaminophen (TYLENOL) tablet 650 mg, 650 mg, Oral, Q6H PRN, 650 mg at 05/09/20 0929 **OR** acetaminophen (TYLENOL) suppository 650 mg, 650 mg, Rectal, Q6H PRN, Alexia Aragon MD    polyethylene glycol (GLYCOLAX) packet 17 g, 17 g, Oral, Daily PRN, Alexia Aragon MD    promethazine (PHENERGAN) tablet 12.5 mg, 12.5 mg, Oral, Q6H PRN **OR** ondansetron (ZOFRAN) injection 4 mg, 4 mg, Intravenous, Q6H PRN, Alexia Aragon MD    [Held by provider] enoxaparin of club or organization: Not on file     Attends meetings of clubs or organizations: Not on file     Relationship status: Not on file    Intimate partner violence     Fear of current or ex partner: Not on file     Emotionally abused: Not on file     Physically abused: Not on file     Forced sexual activity: Not on file   Other Topics Concern    Not on file   Social History Narrative    Not on file     History reviewed. No pertinent family history. ROS: Complete 10 point ROS was obtained with positives in HPI . Constitutional- No weight loss or fevers  Eyes- No diplopia. No photophobia. Ears/nose/throat- No dysphagia. No Dysarthria  Cardiovascular- No palpitations. No chest pain  Respiratory- No dyspnea. No Cough  Gastrointestinal- No Abdominal pain. No Vomiting. Genitourinary- No incontinence. No urinary retention  Musculoskeletal- No myalgia. No arthralgia  Skin- No rash. No easy bruising. Psychiatric- No depression. No anxiety  Endocrine- No diabetes. No thyroid issues. Hematologic- No bleeding difficulty. No fatigue  Neurologic- No weakness. + Headache. PHYSICAL EXAMINATION:  /75   Pulse 55   Temp 97.5 °F (36.4 °C) (Oral)   Resp 16   Ht 6' 1\" (1.854 m)   Wt 188 lb (85.3 kg)   SpO2 100%   BMI 24.80 kg/m²   General  very Dorwsy, no distress, well-nourished  Eyes: unable to visualize the fundi , laceration and stitches above right eye  Psychiatric: cooperative with examination, no  psychotic behavior noted.   Neurologic  Mental status: Eyes open  To voice and sternal rub  orientation to person, place, month, tells me the year is 1989              Attention Poor, unable to keep eyes open without constant verbal stimulus throughout exam              Comprehension intact; follows simple commands  Cranial nerves:   Strength:  5/5 strength in all 4 extremities   Tone: normal in all 4 extremities  Gait: Deferred for safety    LABORATORY DATA:   CBC:   Lab Results   Component Value Date    WBC 9.4 05/09/2020    RBC 4.81 05/09/2020    HGB 15.3 05/09/2020    HCT 45.2 05/09/2020    MCV 94.0 05/09/2020    MCH 31.8 05/09/2020    MCHC 33.9 05/09/2020    RDW 14.2 05/09/2020     05/09/2020    MPV 7.7 05/09/2020     BMP:    Lab Results   Component Value Date     05/09/2020    K 4.1 05/09/2020     05/09/2020    CO2 22 05/09/2020    BUN 15 05/09/2020    LABALBU 4.4 08/17/2017    CREATININE 0.6 05/09/2020    CALCIUM 9.1 05/09/2020    GFRAA >60 05/09/2020    LABGLOM >60 05/09/2020    GLUCOSE 113 05/09/2020        IMAGING STUDIES:   CT of the Cervical-Spine:  Date: 5/9/20; Result:       There is normal alignment of the cervical vertebra with no evidence of acute fracture or traumatic bony abnormality identified.       Multilevel degenerative spondylotic changes are present. There is bony foraminal stenosis noted on the left at the C6-7 level which may result in nerve encroachment.       No significant bony canal stenosis is seen.         No fracture seen. IMPRESSION/PLAN:  Active Problems:    Seizure [facility]) per neurology  SS/P Fall- Cervical and Brain CT are unremarkable. No evidence of fracture or instabiity. No need for cervical collar. Will sign off. Call with questions. Thank you again for this consultation.     Sandy Nagel  5/9/2020

## 2020-05-09 NOTE — CONSULTS
Clinical Pharmacy Progress Note  Medication History     Admit Date: 5/9/2020    Subjective/Objective:  48 yo male admitted with seizures. Asked by Dr. Dinesh Drake to clarify home medications. List of current medications patient is taking is in progress. Home medication list in EPIC updated to reflect changes noted below. Source of information: patient, outpatient fill records, Care Everywhere    Please note: Patient may be an unreliable historian at this time regarding his home medications. He fills his medications at [facility] and [facility] ([facility] Called [facility] to obtain fill history, but [facility]'s [facility]acy is closed on the weekends. Will attempt to call them on Monday to obtain recent fill history. Changes made to medication list:  Medications added:   · Ammonium lactate 12% lotion apply topically to feet as needed  · Lithium 300 mg three times daily  · Ranitidine 150 mg nightly PRN GERD  · Risperidone 2 mg daily  · Olanzapine 10 mg nightly - Patient appears to be prescribed this per documentation in Care Everywhere, but denied taking it. Will attempt to verify fill history with [facility]. Added to medication list, but did not dawood as \"taking\"  · Meloxicam 15 mg daily PRN pain - per [facility], this was recently filled, but patient denied taking it. Added to medication list, but did not dawood as \"taking\"    Please call with any questions.     Katelyn Lara PharmD, [facility]  Wireless: [phone]  5/9/2020 9:50 AM

## 2020-05-09 NOTE — PROCEDURES
Continuous EEG monitoring record    Patient name: Liz Rizo    START: 15:50pm on 05/09/2020  END: 09:00am on 05/10/2020      Electroencephalographer: Maurice Hunter MD PhD      CLINICAL DETAILS:  This EEG was performed on this 49 y. o.yo male admitted for Altered mental Status and concer for subclinical seizures      TECHNICAL DETAILS:  Continuous video-EEG monitoring was performed with 27 surface scalp electrodes placed according to the International 10-20 electrode placement system, using a 32-channel Protecode headbox. All EEG and video information was acquired digitally, including the use of automated spike and seizure detection software to detect epileptiform activity. An event button was also available to be depressed during clinical events. 05/09/2020 00:00am to 09:00am on 05/10/2020   SEIZURES:  None  INTERICTAL:  None  PUSHBUTTONS:  None  BACKGROUND:  Adbundant generalized polyfrequency activity (predominent 8Hz theta) with excessive superimposed beta frequencies that is reactive. EKG:  regular      05/09/2020 15:50pm - 23:59pm   SEIZURES:  None  INTERICTAL:  None  PUSHBUTTONS:  None  BACKGROUND:  Adbundant generalized polyfrequency activity (predominent 8Hz theta) with excessive superimposed beta frequencies that is reactive. EKG:  regular    CLINICAL INTERPRETATION:  This was an abnormal tracing for age and state due to a mild generalized slow wave abnormality indicating diffuse cerebral dysfunction which can be of multiple causes, including structural, or vascular abnormalities, toxic/metabolic conditions, hydrocephalus, or postictal conditions. Excessive beta frequencies are often associated with the use of sedative medications. No epileptiform discharge, focal slowing, or seizures were seen during this recording. Clinical correlation is advised.           Maurice Hunter MD PhD

## 2020-05-10 LAB
ANION GAP SERPL CALCULATED.3IONS-SCNC: 9 MMOL/L (ref 3–16)
BUN BLDV-MCNC: 14 MG/DL (ref 7–20)
CALCIUM SERPL-MCNC: 9.2 MG/DL (ref 8.3–10.6)
CHLORIDE BLD-SCNC: 106 MMOL/L (ref 99–110)
CO2: 26 MMOL/L (ref 21–32)
CREAT SERPL-MCNC: 0.8 MG/DL (ref 0.9–1.3)
EKG ATRIAL RATE: 58 BPM
EKG DIAGNOSIS: NORMAL
EKG P AXIS: 64 DEGREES
EKG P-R INTERVAL: 156 MS
EKG Q-T INTERVAL: 426 MS
EKG QRS DURATION: 104 MS
EKG QTC CALCULATION (BAZETT): 418 MS
EKG R AXIS: -39 DEGREES
EKG T AXIS: 52 DEGREES
EKG VENTRICULAR RATE: 58 BPM
GFR AFRICAN AMERICAN: >60
GFR NON-AFRICAN AMERICAN: >60
GLUCOSE BLD-MCNC: 94 MG/DL (ref 70–99)
HCT VFR BLD CALC: 46 % (ref 40.5–52.5)
HEMOGLOBIN: 15.4 G/DL (ref 13.5–17.5)
MCH RBC QN AUTO: 31.6 PG (ref 26–34)
MCHC RBC AUTO-ENTMCNC: 33.5 G/DL (ref 31–36)
MCV RBC AUTO: 94.3 FL (ref 80–100)
PDW BLD-RTO: 14.4 % (ref 12.4–15.4)
PLATELET # BLD: 201 K/UL (ref 135–450)
PMV BLD AUTO: 8.1 FL (ref 5–10.5)
POTASSIUM REFLEX MAGNESIUM: 4 MMOL/L (ref 3.5–5.1)
RBC # BLD: 4.88 M/UL (ref 4.2–5.9)
SODIUM BLD-SCNC: 141 MMOL/L (ref 136–145)
WBC # BLD: 6.6 K/UL (ref 4–11)

## 2020-05-10 PROCEDURE — 80048 BASIC METABOLIC PNL TOTAL CA: CPT

## 2020-05-10 PROCEDURE — 6370000000 HC RX 637 (ALT 250 FOR IP): Performed by: STUDENT IN AN ORGANIZED HEALTH CARE EDUCATION/TRAINING PROGRAM

## 2020-05-10 PROCEDURE — 2060000000 HC ICU INTERMEDIATE R&B

## 2020-05-10 PROCEDURE — 2580000003 HC RX 258: Performed by: STUDENT IN AN ORGANIZED HEALTH CARE EDUCATION/TRAINING PROGRAM

## 2020-05-10 PROCEDURE — 93010 ELECTROCARDIOGRAM REPORT: CPT | Performed by: INTERNAL MEDICINE

## 2020-05-10 PROCEDURE — 95813 EEG EXTND MNTR 61-119 MIN: CPT

## 2020-05-10 PROCEDURE — 36415 COLL VENOUS BLD VENIPUNCTURE: CPT

## 2020-05-10 PROCEDURE — 6360000002 HC RX W HCPCS: Performed by: STUDENT IN AN ORGANIZED HEALTH CARE EDUCATION/TRAINING PROGRAM

## 2020-05-10 PROCEDURE — 85027 COMPLETE CBC AUTOMATED: CPT

## 2020-05-10 RX ADMIN — LEVETIRACETAM 1000 MG: 100 INJECTION, SOLUTION INTRAVENOUS at 23:28

## 2020-05-10 RX ADMIN — GABAPENTIN 800 MG: 400 CAPSULE ORAL at 08:16

## 2020-05-10 RX ADMIN — ASPIRIN 81 MG 81 MG: 81 TABLET ORAL at 08:16

## 2020-05-10 RX ADMIN — DEXTROSE MONOHYDRATE 100 MG PE: 50 INJECTION, SOLUTION INTRAVENOUS at 05:31

## 2020-05-10 RX ADMIN — DEXTROSE MONOHYDRATE 100 MG PE: 50 INJECTION, SOLUTION INTRAVENOUS at 20:52

## 2020-05-10 RX ADMIN — Medication 10 ML: at 20:52

## 2020-05-10 RX ADMIN — GABAPENTIN 800 MG: 400 CAPSULE ORAL at 14:37

## 2020-05-10 RX ADMIN — RISPERIDONE 2 MG: 1 TABLET ORAL at 08:16

## 2020-05-10 RX ADMIN — LITHIUM CARBONATE 300 MG: 300 TABLET ORAL at 20:52

## 2020-05-10 RX ADMIN — LEVETIRACETAM 1000 MG: 100 INJECTION, SOLUTION INTRAVENOUS at 11:31

## 2020-05-10 RX ADMIN — GABAPENTIN 800 MG: 400 CAPSULE ORAL at 20:52

## 2020-05-10 RX ADMIN — LITHIUM CARBONATE 300 MG: 300 TABLET ORAL at 14:37

## 2020-05-10 RX ADMIN — OLANZAPINE 10 MG: 5 TABLET, FILM COATED ORAL at 20:52

## 2020-05-10 RX ADMIN — LITHIUM CARBONATE 300 MG: 300 TABLET ORAL at 08:16

## 2020-05-10 ASSESSMENT — PAIN SCALES - GENERAL
PAINLEVEL_OUTOF10: 0
PAINLEVEL_OUTOF10: 0

## 2020-05-10 NOTE — PLAN OF CARE
Problem: Falls - Risk of:  Goal: Will remain free from falls  Description: Will remain free from falls  5/10/2020 1004 by Atif Tavera RN  Outcome: Ongoing  Note: Patient high fall risk and is currently bedrest due to EEG monitor. Patient alert and oriented x4, non skid socks on, bed in lowest position and locked, side rails up x2, call light and belongings within reach, bed alarm on for safety, fall sign posted. Will continue to monitor.

## 2020-05-10 NOTE — PROGRESS NOTES
Patient very drowsy throughout beginning of shift. Now alert and oriented x4. VSS. Without seizure activity this shift. Continuous EEG in place. Able to tolerating fluids and eat some of his dinner tray. Resting in bed with eyes closed. Will continue to monitor.

## 2020-05-10 NOTE — PLAN OF CARE
Problem: Falls - Risk of:  Goal: Will remain free from falls  Description: Will remain free from falls  Outcome: Ongoing  Note: Patient remains free from falls this shift. Attempting to get out of bed twice without success. Educated patient on importance of staying in bed and to use call light when in need of assistance, patient verbalizes understanding and now able to comply due to improved mentation. In bed with alarm on, call light within reach, and avasys monitor in place. Problem: Safety:  Goal: Ability to remain free from injury will improve  Description: Ability to remain free from injury will improve  Outcome: Ongoing  Note: Patient remains free from injury throughout this shift. Will continue to monitor safety.

## 2020-05-10 NOTE — PROGRESS NOTES
IV to LFA infiltrated, arm swollen. This RN attempted once to place new IV and was unsuccessful, patient refusing to have this RN try again. ICU charged nurse currently placing an IV on another patient and will be up shortly after. Patient alert and oriented x4, VSS, neuro checks WNL. Patient currently connected to continuous EEG monitor, no seizure activity noted. Will continue to monitor.

## 2020-05-10 NOTE — PROGRESS NOTES
Neurology Progress Note  Seen for spells    Updates:  No interval clinical or electrographic seizures    ROS:   Endocrine- No diabetes. No thyroid issues. Hematologic- No bleeding difficulty. No fatigue  Neurologic- No weakness. + Headache. Exam:  Blood pressure 101/67, pulse 61, temperature 98.3 °F (36.8 °C), temperature source Oral, resp. rate 16, height 6' 1\" (1.854 m), weight 185 lb 6.5 oz (84.1 kg), SpO2 95 %. Constitutional                          Vital signs: BP, HR, and RR reviewed            General Alert, no distress, well-nourished  Eyes: unable to visualize the fundi , laceration and stitches above right eye  Cardiovascular: pulses symmetric in all 4 extremities. No peripheral edema. Psychiatric: cooperative with examination, no  psychotic behavior noted. Neurologic  Mental status: Eyes open spontaneously  orientation to person, place, month, tells me the year is [address] [facility] grossly intact              Memory grossly intact              Attention  Intact, attends well to exam, able to read the clock and calculate coins              Language fluent in conversation              Comprehension intact; follows simple commands as well as 2 step commands crossing the midline  Cranial nerves:   CN2: Visual Fields full w/o extinction on confrontational testing  CN 3,4,6: Tracks right and left  CN5: facial sensation symmetric   CN7:face symmetric without dysarthria  CN8: hearing grossly intact  CN9: palate elevated symmetrically  CN11: trap full strength on shoulder shrug  CN12: tongue midline with protrusion  Strength:  5/5 strength in all 4 extremities   Tone: normal in all 4 extremities  Gait: Deferred for safety        Labs  BUN: 14  Creatinine: 0.8  Glucose: 94  CK: 399  Lactate: 1.6  WBC: 6.6     UA:    Ref.  Range 5/9/2020 04:30   Nitrite, Urine Latest Ref Range: Negative  Negative   Leukocyte Esterase, Urine Latest Ref Range: Negative  Negative discharge , please discuss with the patient that he cannot engage in any activity where loss of consciousness would be dangerous to himself or others (e.g. driving, climbing, swimming alone, etc.) until approved by his physician (seizure-free for at least 3-6 months).     - If electrographic seizures are not identified after patient has been titrated off of AED , would benefit from psych consult and behavioral therapy       A copy of this note was provided for Dr Doe Davidson MD     [facility] [address] Neurology  [phone]

## 2020-05-11 LAB
ANION GAP SERPL CALCULATED.3IONS-SCNC: 10 MMOL/L (ref 3–16)
BUN BLDV-MCNC: 11 MG/DL (ref 7–20)
CALCIUM SERPL-MCNC: 9.5 MG/DL (ref 8.3–10.6)
CHLORIDE BLD-SCNC: 105 MMOL/L (ref 99–110)
CO2: 22 MMOL/L (ref 21–32)
CREAT SERPL-MCNC: 0.7 MG/DL (ref 0.9–1.3)
GFR AFRICAN AMERICAN: >60
GFR NON-AFRICAN AMERICAN: >60
GLUCOSE BLD-MCNC: 118 MG/DL (ref 70–99)
HCT VFR BLD CALC: 50 % (ref 40.5–52.5)
HEMOGLOBIN: 17 G/DL (ref 13.5–17.5)
MCH RBC QN AUTO: 32 PG (ref 26–34)
MCHC RBC AUTO-ENTMCNC: 34 G/DL (ref 31–36)
MCV RBC AUTO: 94 FL (ref 80–100)
PDW BLD-RTO: 14.2 % (ref 12.4–15.4)
PLATELET # BLD: 218 K/UL (ref 135–450)
PMV BLD AUTO: 7.6 FL (ref 5–10.5)
POTASSIUM REFLEX MAGNESIUM: 4.4 MMOL/L (ref 3.5–5.1)
RBC # BLD: 5.32 M/UL (ref 4.2–5.9)
REASON FOR REJECTION: NORMAL
REJECTED TEST: NORMAL
SODIUM BLD-SCNC: 137 MMOL/L (ref 136–145)
WBC # BLD: 8.5 K/UL (ref 4–11)

## 2020-05-11 PROCEDURE — 2580000003 HC RX 258: Performed by: STUDENT IN AN ORGANIZED HEALTH CARE EDUCATION/TRAINING PROGRAM

## 2020-05-11 PROCEDURE — 36415 COLL VENOUS BLD VENIPUNCTURE: CPT

## 2020-05-11 PROCEDURE — 6370000000 HC RX 637 (ALT 250 FOR IP): Performed by: STUDENT IN AN ORGANIZED HEALTH CARE EDUCATION/TRAINING PROGRAM

## 2020-05-11 PROCEDURE — 6360000002 HC RX W HCPCS: Performed by: STUDENT IN AN ORGANIZED HEALTH CARE EDUCATION/TRAINING PROGRAM

## 2020-05-11 PROCEDURE — 2060000000 HC ICU INTERMEDIATE R&B

## 2020-05-11 PROCEDURE — 85027 COMPLETE CBC AUTOMATED: CPT

## 2020-05-11 PROCEDURE — 80048 BASIC METABOLIC PNL TOTAL CA: CPT

## 2020-05-11 PROCEDURE — 95813 EEG EXTND MNTR 61-119 MIN: CPT

## 2020-05-11 RX ADMIN — ASPIRIN 81 MG 81 MG: 81 TABLET ORAL at 08:49

## 2020-05-11 RX ADMIN — LITHIUM CARBONATE 300 MG: 300 TABLET ORAL at 08:49

## 2020-05-11 RX ADMIN — RISPERIDONE 2 MG: 1 TABLET ORAL at 08:49

## 2020-05-11 RX ADMIN — GABAPENTIN 800 MG: 400 CAPSULE ORAL at 21:38

## 2020-05-11 RX ADMIN — GABAPENTIN 800 MG: 400 CAPSULE ORAL at 08:49

## 2020-05-11 RX ADMIN — ENOXAPARIN SODIUM 40 MG: 40 INJECTION SUBCUTANEOUS at 08:49

## 2020-05-11 RX ADMIN — GABAPENTIN 800 MG: 400 CAPSULE ORAL at 14:04

## 2020-05-11 RX ADMIN — Medication 10 ML: at 08:49

## 2020-05-11 RX ADMIN — LITHIUM CARBONATE 300 MG: 300 TABLET ORAL at 14:04

## 2020-05-11 RX ADMIN — DEXTROSE MONOHYDRATE 100 MG PE: 50 INJECTION, SOLUTION INTRAVENOUS at 08:49

## 2020-05-11 RX ADMIN — LITHIUM CARBONATE 300 MG: 300 TABLET ORAL at 21:38

## 2020-05-11 RX ADMIN — OLANZAPINE 10 MG: 5 TABLET, FILM COATED ORAL at 21:38

## 2020-05-11 ASSESSMENT — PAIN SCALES - GENERAL
PAINLEVEL_OUTOF10: 0

## 2020-05-11 NOTE — PROGRESS NOTES
Pulses: Normal pulses. Heart sounds: No murmur. Pulmonary:      Effort: Pulmonary effort is normal. No respiratory distress. Breath sounds: Normal breath sounds. No stridor. No wheezing. Abdominal:      General: Abdomen is flat. Bowel sounds are normal. There is no distension. Palpations: Abdomen is soft. There is no mass. Tenderness: There is no abdominal tenderness. Hernia: No hernia is present. Musculoskeletal: Normal range of motion. General: No swelling or deformity. Skin:     General: Skin is warm and dry. Findings: Bruising present. Neurological:      General: No focal deficit present. Mental Status: He is alert and oriented to person, place, and time. Cranial Nerves: No cranial nerve deficit. Comments: On cvEEG          LABS:    CBC:   Recent Labs     05/09/20  0457 05/10/20  0528 05/11/20  0717   WBC 9.4 6.6 8.5   HGB 15.3 15.4 17.0   HCT 45.2 46.0 50.0   MCV 94.0 94.3 94.0    201 218                                                                BMP:    Recent Labs     05/09/20  0457 05/10/20  0529 05/11/20  0525    141 137   K 4.1 4.0 4.4    106 105   CO2 22 26 22   BUN 15 14 11   CREATININE 0.6* 0.8* 0.7*   GLUCOSE 113* 94 118*        Troponin:   Recent Labs     05/09/20  0052   TROPONINI <0.01        U/A:  Recent Labs     05/09/20  0430 05/09/20  1916   COLORU Yellow  --    PHUR 6.0 6.0   CLARITYU Clear  --    SPECGRAV >=1.030  --    LEUKOCYTESUR Negative  --    UROBILINOGEN 0.2  --    BILIRUBINUR Negative  --    BLOODU Negative  --    GLUCOSEU Negative  --       -----------------------------------------------------------------  RAD:   MRI BRAIN W WO CONTRAST   Final Result      1. Negative      No mass effect or abnormal enhancement. XR SHOULDER LEFT (MIN 2 VIEWS)   Final Result      1. No acute abnormality. XR CHEST STANDARD (2 VW)   Final Result      No acute disease.          CT HEAD WO CONTRAST

## 2020-05-11 NOTE — PROGRESS NOTES
Physical Therapy/Occupational therapy  HOLD    Orders received, chart reviewed. Spoke with RN who states pt is on continuous EEG. No plans to come off today. Will f/u 5/12. RN in agreement.     Jong Hernández, PT, DPT  [id number]  Carmen Wilson OTR/L #[id number]

## 2020-05-11 NOTE — PLAN OF CARE
Problem: Falls - Risk of:  Goal: Will remain free from falls  Description: Will remain free from falls  Outcome: Ongoing   Fall risk precautions in place. Bed is locked and in lowest position. 2/4 side rails up. Call light within reach. Fall risk Bracelet in place. Frequent checks on patient. Camera in use for patient safety. Free from falls at this time. Will continue to monitor. Problem: PAIN  Goal: Patient's pain/discomfort is manageable  Outcome: Ongoing   Denies pain at this time. Will continue to monitor.

## 2020-05-11 NOTE — PROGRESS NOTES
Patient upset, states \"I want to get out of bed, if not, I want to leave\". RN spoke with patient and notified MD. MD rounded at bedside and spoke with patient about risks if patient decides to leave. Patient calmed down and agreed to stay and continue with EEG monitoring. Will continue to monitor.

## 2020-05-11 NOTE — PROGRESS NOTES
Patient alert and oriented x4. VSS. Without seizure activity this shift. Continuous EEG in place. Able to tolerating diet. Voiding per urinal. Resting in bed with eyes closed. Denies any complaints this shift. Will continue to monitor.

## 2020-05-11 NOTE — PROGRESS NOTES
CONTINUOUS EEG    Name:  [address] [place] [address] Record Number:  [id number]  Age: 49 y.o. Gender: male  : 1965  Today's Date:  2020  Room:  5525/5525-01  Vital Signs   /63   Pulse 66   Temp 98.7 °F (37.1 °C) (Oral)   Resp 16   Ht 6' 1\" (1.854 m)   Wt 184 lb 8.4 oz (83.7 kg)   SpO2 97%   BMI 24.35 kg/m²       Patient currently on continuous EEG monitoring. All EEG leads are currently in place with no current issues. Verified [employer] connection via team viewer. Checked in with patient RN for current plan of care. Comments: Continue monitoring. All leads within 10K limit.     Electronically signed by Charley Turner on 2020 at 9:43 AM

## 2020-05-11 NOTE — PROGRESS NOTES
Neurology Progress Note  Seen for spells     Updates:  No interval clinical or electrographic seizures   No major changes     ROS:   Endocrine- No diabetes. No thyroid issues. Hematologic- No bleeding difficulty. No fatigue  Neurologic- No weakness. + Headache. Exam:  Blood pressure 101/63, pulse 66, temperature 98.7 °F (37.1 °C), temperature source Oral, resp. rate 16, height 6' 1\" (1.854 m), weight 184 lb 8.4 oz (83.7 kg), SpO2 97 %. Constitutional                          Vital signs: BP, HR, and RR reviewed            General Alert, no distress, well-nourished  Eyes: unable to visualize the fundi , laceration and stitches above right eye  Cardiovascular: pulses symmetric in all 4 extremities.  No peripheral edema. Psychiatric: cooperative with examination, no  psychotic behavior noted. Neurologic  Mental status: Eyes open spontaneously  orientation to person, place, year, tells me the month is June              General fund of knowledge grossly intact              Memory grossly intact              Attention  Intact, attends well to exam, able to read the clock              Language fluent in conversation              Comprehension intact; follows simple commands   Cranial nerves:   CN2: Visual Fields full w/o extinction on confrontational testing  CN 3,4,6: Tracks right and left  CN5: facial sensation symmetric   CN7:face symmetric without dysarthria  CN8: hearing grossly intact  CN9: palate elevated symmetrically  CN11: trap full strength on shoulder shrug  CN12: tongue midline with protrusion  Strength:  5/5 strength in all 4 extremities   Tone: normal in all 4 extremities  Gait: Deferred for safety        Labs  BUN: 11  Creatinine: 0.7  Glucose: 118  CK: 399  Lactate: 0.9  WBC: 8.5     UA:    Ref.  Range 5/9/2020 04:30   Nitrite, Urine Latest Ref Range: Negative  Negative   Leukocyte Esterase, Urine Latest Ref Range: Negative  Negative         Studies  cvEEG  05/10/2020 23:00pm to 08:00am on 05/11/2020

## 2020-05-11 NOTE — PROGRESS NOTES
Pharmacy called [facility]) this AM for additional information on patient's home medication. · Lithium was DC'ed per physician at [facility] in May 2019  · Risperidone was DC'ed in September 2018  · Olanzapine 10mg nightly was filled only for 5 days in November 2019. Med list is updated accordingly. Thank you for consulting pharmacy. Please call with any questions.     Jo Ann Szymanski, PharmD  PGY1 Pharmacy Resident  Wireless: [phone] ([phone])  5/11/2020 9:24 AM

## 2020-05-12 VITALS
BODY MASS INDEX: 24.46 KG/M2 | WEIGHT: 184.53 LBS | DIASTOLIC BLOOD PRESSURE: 72 MMHG | SYSTOLIC BLOOD PRESSURE: 119 MMHG | OXYGEN SATURATION: 94 % | HEART RATE: 75 BPM | HEIGHT: 73 IN | TEMPERATURE: 97.2 F | RESPIRATION RATE: 16 BRPM

## 2020-05-12 LAB
ANION GAP SERPL CALCULATED.3IONS-SCNC: 10 MMOL/L (ref 3–16)
BUN BLDV-MCNC: 20 MG/DL (ref 7–20)
CALCIUM SERPL-MCNC: 9.8 MG/DL (ref 8.3–10.6)
CHLORIDE BLD-SCNC: 105 MMOL/L (ref 99–110)
CO2: 21 MMOL/L (ref 21–32)
CREAT SERPL-MCNC: 0.8 MG/DL (ref 0.9–1.3)
GFR AFRICAN AMERICAN: >60
GFR NON-AFRICAN AMERICAN: >60
GLUCOSE BLD-MCNC: 105 MG/DL (ref 70–99)
HCT VFR BLD CALC: 49.1 % (ref 40.5–52.5)
HEMOGLOBIN: 16.3 G/DL (ref 13.5–17.5)
MCH RBC QN AUTO: 31.7 PG (ref 26–34)
MCHC RBC AUTO-ENTMCNC: 33.2 G/DL (ref 31–36)
MCV RBC AUTO: 95.5 FL (ref 80–100)
PDW BLD-RTO: 14.2 % (ref 12.4–15.4)
PLATELET # BLD: 188 K/UL (ref 135–450)
PMV BLD AUTO: 8 FL (ref 5–10.5)
POTASSIUM REFLEX MAGNESIUM: 4.1 MMOL/L (ref 3.5–5.1)
RBC # BLD: 5.15 M/UL (ref 4.2–5.9)
SODIUM BLD-SCNC: 136 MMOL/L (ref 136–145)
WBC # BLD: 8.8 K/UL (ref 4–11)

## 2020-05-12 PROCEDURE — 36415 COLL VENOUS BLD VENIPUNCTURE: CPT

## 2020-05-12 PROCEDURE — 97116 GAIT TRAINING THERAPY: CPT

## 2020-05-12 PROCEDURE — 6370000000 HC RX 637 (ALT 250 FOR IP): Performed by: STUDENT IN AN ORGANIZED HEALTH CARE EDUCATION/TRAINING PROGRAM

## 2020-05-12 PROCEDURE — 80048 BASIC METABOLIC PNL TOTAL CA: CPT

## 2020-05-12 PROCEDURE — 2580000003 HC RX 258: Performed by: STUDENT IN AN ORGANIZED HEALTH CARE EDUCATION/TRAINING PROGRAM

## 2020-05-12 PROCEDURE — 6360000002 HC RX W HCPCS: Performed by: STUDENT IN AN ORGANIZED HEALTH CARE EDUCATION/TRAINING PROGRAM

## 2020-05-12 PROCEDURE — 85027 COMPLETE CBC AUTOMATED: CPT

## 2020-05-12 PROCEDURE — 97161 PT EVAL LOW COMPLEX 20 MIN: CPT

## 2020-05-12 PROCEDURE — 97530 THERAPEUTIC ACTIVITIES: CPT

## 2020-05-12 PROCEDURE — 95813 EEG EXTND MNTR 61-119 MIN: CPT

## 2020-05-12 PROCEDURE — 97535 SELF CARE MNGMENT TRAINING: CPT

## 2020-05-12 PROCEDURE — 97165 OT EVAL LOW COMPLEX 30 MIN: CPT

## 2020-05-12 RX ADMIN — Medication 10 ML: at 08:37

## 2020-05-12 RX ADMIN — ENOXAPARIN SODIUM 40 MG: 40 INJECTION SUBCUTANEOUS at 08:35

## 2020-05-12 RX ADMIN — GABAPENTIN 800 MG: 400 CAPSULE ORAL at 08:35

## 2020-05-12 RX ADMIN — ASPIRIN 81 MG 81 MG: 81 TABLET ORAL at 08:35

## 2020-05-12 RX ADMIN — LITHIUM CARBONATE 300 MG: 300 TABLET ORAL at 08:35

## 2020-05-12 RX ADMIN — RISPERIDONE 2 MG: 1 TABLET ORAL at 08:35

## 2020-05-12 ASSESSMENT — PAIN SCALES - GENERAL: PAINLEVEL_OUTOF10: 0

## 2020-05-12 NOTE — CARE COORDINATION
Case Management Assessment            Discharge Note                    Date / Time of Note: 5/12/2020 12:21 PM                  Discharge Note Completed by: Masood Carmen spoke with Delaney Patricia PT and pt does not need a 4 wheeled roller. He is steady with his cane which he has at home therefore a 4 wheeled walker would not be covered by his insurance if we do not have therapy evals that support this. Sheeba Mauro RN is aware and pt has been told. Kostas arranged to take patient to home. Patient Name: Rebecca Rizo   YOB: 1965  Diagnosis: Seizure ([address] [R56.9]  Seizure ([address] [R56.9]   Date / Time: 5/9/2020 12:21 AM    Current PCP: No primary care provider on file. Clinic patient: No    Hospitalization in the last 30 days: No    Advance Directives:  Code Status: Full Code  [place] DNR form completed and on chart: No    Financial:  Payor: Nando Reynoso / Plan: Ina Britton / Product Type: *No Product type* /      Pharmacy:    [facility] [address] [phone] - F [phone]  [facility] [address]  Phone: [phone] Fax: [phone]      Assistance purchasing medications?: Potential Assistance Purchasing Medications: No  Assistance provided by Case Management: None at this time    Does patient want to participate in local refill/ meds to beds program?: No    Meds To Beds General Rules:  1. Can ONLY be done Monday- Friday between 8:30am-5pm  2. Prescription(s) must be in pharmacy by 3pm to be filled same day  3. Copy of patient's insurance/ prescription drug card and patient face sheet must be sent along with the prescription(s)  4. Cost of Rx cannot be added to hospital bill. If financial assistance is needed, please contact unit  or ;  or  CANNOT provide pharmacy voucher for patients co-pays  5.  Patients can then  the prescription on their way out
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
## 2020-05-12 NOTE — PLAN OF CARE
Problem: Musculor/Skeletal Functional Status  Intervention: PT Evaluation/treatment  Note: Increase safety and independence with functional mobility.
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
## 2020-05-12 NOTE — PROGRESS NOTES
CONTINUOUS EEG    Name:  [address] [place] [address] Record Number:  [id number]  Age: 49 y.o. Gender: male  : 1965  Today's Date:  2020  Room:  5525/5525-01  Vital Signs   /80   Pulse 71   Temp 97.8 °F (36.6 °C) (Oral)   Resp 16   Ht 6' 1\" (1.854 m)   Wt 184 lb 8.4 oz (83.7 kg)   SpO2 98%   BMI 24.35 kg/m²       Patient currently on continuous EEG monitoring. All EEG leads are currently in place with no current issues. Verified [employer] connection via team viewer. Checked in with patient RN for current plan of care. Comments: Continue monitoring. All leads within 10K limit.     Electronically signed by Corinne Lang on 2020 at 10:30 PM

## 2020-05-12 NOTE — PROGRESS NOTES
Occupational Therapy   Occupational Therapy Initial Assessment/ Treatment   If patient discharges prior to next session this note with serve as a discharge summary    Date: 2020   Patient Name: Lesly Hawk  MRN: [id number]     : 1965    Date of Service: 2020    Discharge Recommendations: Lesly Hawk scored a 19/24 on the AM-PAC ADL Inpatient form. Current research shows that an AM-PAC score of 18 or greater is typically associated with a discharge to the patient's home setting. Based on the patient's AM-PAC score, and their current ADL deficits, it is recommended that the patient have 2-3 sessions per week of Occupational Therapy at d/c to increase the patient's independence. At this time, this patient demonstrates the endurance and safety to discharge home with (home vs OP services) and a follow up treatment frequency of 2-3x/wk. Please see assessment section for further patient specific details. If patient discharges prior to next session this note will serve as a discharge summary. Please see below for the latest assessment towards goals. OT Equipment Recommendations  Other: Use of a shower chair due to seizures    Assessment   Performance deficits / Impairments: Decreased functional mobility ; Decreased ADL status; Decreased balance;Decreased high-level IADLs  Prognosis: Good  Decision Making: Low Complexity  OT Education: OT Role;ADL Adaptive Strategies;Transfer Training;Plan of Care;Precautions  REQUIRES OT FOLLOW UP: Yes  Activity Tolerance  Activity Tolerance: Patient Tolerated treatment well  Safety Devices  Safety Devices in place: Yes  Type of devices: Left in chair; All fall risk precautions in place;Nurse notified(sitter present at bedside)           Patient Diagnosis(es): The primary encounter diagnosis was Seizure [facility]).  Diagnoses of Laceration of right eyebrow, initial encounter and Minor head injury, initial encounter were also pertinent to this

## 2021-08-19 ENCOUNTER — HOSPITAL ENCOUNTER (EMERGENCY)
Age: 56
Discharge: HOME | End: 2021-08-19
Attending: EMERGENCY MEDICINE
Payer: MEDICAID

## 2021-08-19 ENCOUNTER — APPOINTMENT (OUTPATIENT)
Dept: GENERAL RADIOLOGY | Age: 56
End: 2021-08-19
Payer: MEDICAID

## 2021-08-19 VITALS
RESPIRATION RATE: 16 BRPM | HEIGHT: 72 IN | SYSTOLIC BLOOD PRESSURE: 112 MMHG | WEIGHT: 185 LBS | BODY MASS INDEX: 25.06 KG/M2 | HEART RATE: 50 BPM | OXYGEN SATURATION: 100 % | DIASTOLIC BLOOD PRESSURE: 77 MMHG

## 2021-08-19 DIAGNOSIS — T67.1XXA HEAT SYNCOPE, INITIAL ENCOUNTER: Primary | ICD-10-CM

## 2021-08-19 DIAGNOSIS — R07.9 CHEST PAIN, UNSPECIFIED TYPE: ICD-10-CM

## 2021-08-19 LAB
ANION GAP SERPL CALCULATED.3IONS-SCNC: 13 MMOL/L (ref 3–16)
BASOPHILS ABSOLUTE: 0 K/UL (ref 0–0.2)
BASOPHILS RELATIVE PERCENT: 0.6 %
BUN BLDV-MCNC: 15 MG/DL (ref 7–20)
CALCIUM SERPL-MCNC: 9.7 MG/DL (ref 8.3–10.6)
CHLORIDE BLD-SCNC: 104 MMOL/L (ref 99–110)
CO2: 23 MMOL/L (ref 21–32)
CREAT SERPL-MCNC: 0.8 MG/DL (ref 0.9–1.3)
EKG ATRIAL RATE: 59 BPM
EKG DIAGNOSIS: NORMAL
EKG P AXIS: 50 DEGREES
EKG P-R INTERVAL: 164 MS
EKG Q-T INTERVAL: 396 MS
EKG QRS DURATION: 98 MS
EKG QTC CALCULATION (BAZETT): 392 MS
EKG R AXIS: -38 DEGREES
EKG T AXIS: 54 DEGREES
EKG VENTRICULAR RATE: 59 BPM
EOSINOPHILS ABSOLUTE: 0.1 K/UL (ref 0–0.6)
EOSINOPHILS RELATIVE PERCENT: 1.1 %
GFR AFRICAN AMERICAN: >60
GFR NON-AFRICAN AMERICAN: >60
GLUCOSE BLD-MCNC: 110 MG/DL (ref 70–99)
HCT VFR BLD CALC: 43.8 % (ref 40.5–52.5)
HEMOGLOBIN: 15.1 G/DL (ref 13.5–17.5)
LYMPHOCYTES ABSOLUTE: 2 K/UL (ref 1–5.1)
LYMPHOCYTES RELATIVE PERCENT: 27.9 %
MCH RBC QN AUTO: 32.3 PG (ref 26–34)
MCHC RBC AUTO-ENTMCNC: 34.5 G/DL (ref 31–36)
MCV RBC AUTO: 93.5 FL (ref 80–100)
MONOCYTES ABSOLUTE: 0.6 K/UL (ref 0–1.3)
MONOCYTES RELATIVE PERCENT: 8.2 %
NEUTROPHILS ABSOLUTE: 4.5 K/UL (ref 1.7–7.7)
NEUTROPHILS RELATIVE PERCENT: 62.2 %
PDW BLD-RTO: 14.1 % (ref 12.4–15.4)
PLATELET # BLD: 219 K/UL (ref 135–450)
PMV BLD AUTO: 8.1 FL (ref 5–10.5)
POTASSIUM REFLEX MAGNESIUM: 3.8 MMOL/L (ref 3.5–5.1)
RBC # BLD: 4.68 M/UL (ref 4.2–5.9)
SODIUM BLD-SCNC: 140 MMOL/L (ref 136–145)
TROPONIN: <0.01 NG/ML
TROPONIN: <0.01 NG/ML
WBC # BLD: 7.3 K/UL (ref 4–11)

## 2021-08-19 PROCEDURE — 2580000003 HC RX 258: Performed by: EMERGENCY MEDICINE

## 2021-08-19 PROCEDURE — 80048 BASIC METABOLIC PNL TOTAL CA: CPT

## 2021-08-19 PROCEDURE — 36415 COLL VENOUS BLD VENIPUNCTURE: CPT

## 2021-08-19 PROCEDURE — 71046 X-RAY EXAM CHEST 2 VIEWS: CPT

## 2021-08-19 PROCEDURE — 93005 ELECTROCARDIOGRAM TRACING: CPT | Performed by: EMERGENCY MEDICINE

## 2021-08-19 PROCEDURE — 6370000000 HC RX 637 (ALT 250 FOR IP): Performed by: EMERGENCY MEDICINE

## 2021-08-19 PROCEDURE — 99284 EMERGENCY DEPT VISIT MOD MDM: CPT

## 2021-08-19 PROCEDURE — 84484 ASSAY OF TROPONIN QUANT: CPT

## 2021-08-19 PROCEDURE — 85025 COMPLETE CBC W/AUTO DIFF WBC: CPT

## 2021-08-19 RX ORDER — NITROGLYCERIN 0.4 MG/1
0.4 TABLET SUBLINGUAL EVERY 5 MIN PRN
Status: DISCONTINUED | OUTPATIENT
Start: 2021-08-19 | End: 2021-08-19 | Stop reason: HOSPADM

## 2021-08-19 RX ORDER — SODIUM CHLORIDE, SODIUM LACTATE, POTASSIUM CHLORIDE, AND CALCIUM CHLORIDE .6; .31; .03; .02 G/100ML; G/100ML; G/100ML; G/100ML
1000 INJECTION, SOLUTION INTRAVENOUS ONCE
Status: COMPLETED | OUTPATIENT
Start: 2021-08-19 | End: 2021-08-19

## 2021-08-19 RX ADMIN — SODIUM CHLORIDE, POTASSIUM CHLORIDE, SODIUM LACTATE AND CALCIUM CHLORIDE 1000 ML: 600; 310; 30; 20 INJECTION, SOLUTION INTRAVENOUS at 16:59

## 2021-08-19 RX ADMIN — NITROGLYCERIN 0.4 MG: 0.4 TABLET, ORALLY DISINTEGRATING SUBLINGUAL at 16:59

## 2021-08-19 ASSESSMENT — PAIN DESCRIPTION - LOCATION: LOCATION: CHEST

## 2021-08-19 ASSESSMENT — ENCOUNTER SYMPTOMS
SHORTNESS OF BREATH: 0
NAUSEA: 0
ABDOMINAL PAIN: 0
DIARRHEA: 0
VOMITING: 0
COUGH: 0

## 2021-08-19 ASSESSMENT — PAIN DESCRIPTION - ORIENTATION: ORIENTATION: MID

## 2021-08-19 ASSESSMENT — HEART SCORE: ECG: 1

## 2021-08-19 ASSESSMENT — PAIN SCALES - GENERAL: PAINLEVEL_OUTOF10: 0

## 2021-08-19 ASSESSMENT — PAIN DESCRIPTION - PAIN TYPE: TYPE: ACUTE PAIN
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
## 2021-08-19 NOTE — ED PROVIDER NOTES
[address] ENCOUNTER          ATTENDING PHYSICIAN NOTE       Date of evaluation: 8/19/2021    Chief Complaint     Dizziness and Loss of Consciousness      History of Present Illness     Pawan Foster is a 48 y.o. male history of coronary artery disease with prior MIs as well as seizure disorder who presents today after a syncopal episode while at work. He states that the place where he works is very hot and he began experiencing some chest pain with some left arm numbness and diaphoresis and he felt that this was similar symptoms to a prior MI and he held onto a counter and eased himself to the ground and the next thing he knows he was waking up with people over the top of them after having passed out completely. He continues to have 8 out of 10 chest pain with no radiation other than to the left arm and some left arm numbness as well as diaphoresis. He is not short of breath and denies any nausea or vomiting. The patient does not normally take nitroglycerin at home. He did take an aspirin both today and yesterday as he had a similar episode yesterday which went away without issue. On brief review of the patient's chart, it was noted that he has had multiple presentations for syncope and or seizures and has a history of psychiatric illness. But I cannot substantiate any type of coronary artery disease or procedures associated with it other than a nuclear stress test in July 2019. Review of Systems     Review of Systems   Constitutional: Positive for diaphoresis. Negative for chills and fever. Respiratory: Negative for cough and shortness of breath. Cardiovascular: Positive for chest pain. Gastrointestinal: Negative for abdominal pain, diarrhea, nausea and vomiting. Neurological: Positive for syncope. All other systems reviewed and are negative.       Past Medical, Surgical, Family, and Social History     He has a past medical history of Acute MI [facility]), CAD change  Q Waves: none    Clinical Impression: normal sinus rhythm with no acute changes    RADIOLOGY:  XR CHEST (2 VW)   Final Result      No evidence of acute process. LABS:   Results for orders placed or performed during the hospital encounter of 84/87/19   Basic Metabolic Panel w/ Reflex to MG   Result Value Ref Range    Sodium 140 136 - 145 mmol/L    Potassium reflex Magnesium 3.8 3.5 - 5.1 mmol/L    Chloride 104 99 - 110 mmol/L    CO2 23 21 - 32 mmol/L    Anion Gap 13 3 - 16    Glucose 110 (H) 70 - 99 mg/dL    BUN 15 7 - 20 mg/dL    CREATININE 0.8 (L) 0.9 - 1.3 mg/dL    GFR Non-African American >60 >60    GFR African American >60 >60    Calcium 9.7 8.3 - 10.6 mg/dL   CBC Auto Differential   Result Value Ref Range    WBC 7.3 4.0 - 11.0 K/uL    RBC 4.68 4.20 - 5.90 M/uL    Hemoglobin 15.1 13.5 - 17.5 g/dL    Hematocrit 43.8 40.5 - 52.5 %    MCV 93.5 80.0 - 100.0 fL    MCH 32.3 26.0 - 34.0 pg    MCHC 34.5 31.0 - 36.0 g/dL    RDW 14.1 12.4 - 15.4 %    Platelets 198 135 - 050 K/uL    MPV 8.1 5.0 - 10.5 fL    Neutrophils % 62.2 %    Lymphocytes % 27.9 %    Monocytes % 8.2 %    Eosinophils % 1.1 %    Basophils % 0.6 %    Neutrophils Absolute 4.5 1.7 - 7.7 K/uL    Lymphocytes Absolute 2.0 1.0 - 5.1 K/uL    Monocytes Absolute 0.6 0.0 - 1.3 K/uL    Eosinophils Absolute 0.1 0.0 - 0.6 K/uL    Basophils Absolute 0.0 0.0 - 0.2 K/uL   Troponin   Result Value Ref Range    Troponin <0.01 <0.01 ng/mL     RECENT VITALS:  BP: (!) 123/90, , Pulse: 65, Resp: 16, SpO2: 100 %       ED Course     Nursing Notes, Past Medical Hx, Past Surgical Hx, Social Hx,Allergies, and Family Hx were reviewed.     patient was given the following medications:  Orders Placed This Encounter   Medications    nitroGLYCERIN (NITROSTAT) SL tablet 0.4 mg    lactated ringers bolus       CONSULTS:  None    MEDICAL DECISIONMAKING / ASSESSMENT / Attila Floyd is a 48 y.o. male presenting with a syncopal episode at work where it was very hot after an episode of chest pain. As it turns out the patient actually has been having chest pain since yesterday. He states that he has had 4-5 heart attacks that have all been mild but in review of his records there is no evidence that he is ever had a heart attack. He did have a stress test 1 year ago that was normal.  All of the troponins that I have seen that have been run on him for chest pain have been negative. Is not clear why he thinks he has had MIs in the past.  His work-up here is revealing an unchanged EKG with left axis deviation, negative chest x-ray, and troponin x2 that are negative. The patient received nitroglycerin which alleviated his chest pain which makes the story little bit more suspicious and therefore for this reason it puts him in the moderate category with a heart score of 4. The patient was told that we recommend him staying for a but he refused to stay and although this is the case, it is reasonably reassuring that his troponins have been negative in the setting of 24+ hours of chest pain. The patient states that he can follow-up with his primary care physician at [facility] tomorrow. He would rather arrange follow-up that way than stay in the hospital.      Clinical Impression     1. Heat syncope, initial encounter    2.  Chest pain, unspecified type        Disposition     [facility]    In 1 day        DISCHARGE MEDICATIONS:  Current Discharge Medication List          DISPOSITION Discharge - Pending Orders Complete 08/19/2021 06:03:44 PM       López Jimenez MD  08/19/21 [id number]

## 2021-08-19 NOTE — ED NOTES
Pt has d/c order. D/C instructions given. Pt verbalized understanding. Pt out to lobby without assistance.        Ana Harley RN  08/19/21 [id number]

## 2024-05-27 ENCOUNTER — HOSPITAL ENCOUNTER (EMERGENCY)
Age: 59
Discharge: HOME | End: 2024-05-28
Attending: EMERGENCY MEDICINE
Payer: MEDICAID

## 2024-05-27 DIAGNOSIS — S43.401A SPRAIN OF RIGHT SHOULDER, UNSPECIFIED SHOULDER SPRAIN TYPE, INITIAL ENCOUNTER: ICD-10-CM

## 2024-05-27 DIAGNOSIS — R56.9 SEIZURES (HCC): Primary | ICD-10-CM

## 2024-05-27 PROCEDURE — 99284 EMERGENCY DEPT VISIT MOD MDM: CPT

## 2024-05-28 ENCOUNTER — APPOINTMENT (OUTPATIENT)
Dept: CT IMAGING | Age: 59
End: 2024-05-28
Payer: MEDICAID

## 2024-05-28 ENCOUNTER — APPOINTMENT (OUTPATIENT)
Dept: GENERAL RADIOLOGY | Age: 59
End: 2024-05-28
Payer: MEDICAID

## 2024-05-28 VITALS
SYSTOLIC BLOOD PRESSURE: 103 MMHG | HEIGHT: 72 IN | RESPIRATION RATE: 11 BRPM | HEART RATE: 75 BPM | WEIGHT: 192 LBS | DIASTOLIC BLOOD PRESSURE: 73 MMHG | OXYGEN SATURATION: 95 % | BODY MASS INDEX: 26.01 KG/M2 | TEMPERATURE: 98 F

## 2024-05-28 LAB
ALBUMIN SERPL-MCNC: 3.9 G/DL (ref 3.4–5)
ALBUMIN/GLOB SERPL: 1.2 {RATIO} (ref 1.1–2.2)
ALP SERPL-CCNC: 78 U/L (ref 40–129)
ALT SERPL-CCNC: 12 U/L (ref 10–40)
AMORPHOUS: PRESENT
ANION GAP SERPL CALCULATED.3IONS-SCNC: 14 MMOL/L (ref 3–16)
AST SERPL-CCNC: 20 U/L (ref 15–37)
BACTERIA URNS QL MICRO: ABNORMAL /HPF
BASOPHILS # BLD: 0.1 K/UL (ref 0–0.2)
BASOPHILS NFR BLD: 0.7 %
BILIRUB SERPL-MCNC: 0.4 MG/DL (ref 0–1)
BILIRUB UR QL STRIP.AUTO: NEGATIVE
BUN SERPL-MCNC: 10 MG/DL (ref 7–20)
CALCIUM SERPL-MCNC: 9.4 MG/DL (ref 8.3–10.6)
CHLORIDE SERPL-SCNC: 107 MMOL/L (ref 99–110)
CLARITY UR: CLEAR
CO2 SERPL-SCNC: 20 MMOL/L (ref 21–32)
COLOR UR: YELLOW
CREAT SERPL-MCNC: 0.7 MG/DL (ref 0.9–1.3)
DEPRECATED RDW RBC AUTO: 14.3 % (ref 12.4–15.4)
EOSINOPHIL # BLD: 0.1 K/UL (ref 0–0.6)
EOSINOPHIL NFR BLD: 0.6 %
EPI CELLS #/AREA URNS AUTO: 0 /HPF (ref 0–5)
ETHANOLAMINE SERPL-MCNC: 117 MG/DL (ref 0–0.08)
GFR SERPLBLD CREATININE-BSD FMLA CKD-EPI: >90 ML/MIN/{1.73_M2}
GLUCOSE SERPL-MCNC: 115 MG/DL (ref 70–99)
GLUCOSE UR STRIP.AUTO-MCNC: NEGATIVE MG/DL
HCT VFR BLD AUTO: 45.7 % (ref 40.5–52.5)
HGB BLD-MCNC: 15.4 G/DL (ref 13.5–17.5)
HGB UR QL STRIP.AUTO: NEGATIVE
HYALINE CASTS #/AREA URNS AUTO: 1 /LPF (ref 0–8)
KETONES UR STRIP.AUTO-MCNC: NEGATIVE MG/DL
LEUKOCYTE ESTERASE UR QL STRIP.AUTO: ABNORMAL
LYMPHOCYTES # BLD: 2.2 K/UL (ref 1–5.1)
LYMPHOCYTES NFR BLD: 24.5 %
MAGNESIUM SERPL-MCNC: 2.2 MG/DL (ref 1.8–2.4)
MCH RBC QN AUTO: 31.2 PG (ref 26–34)
MCHC RBC AUTO-ENTMCNC: 33.6 G/DL (ref 31–36)
MCV RBC AUTO: 92.9 FL (ref 80–100)
MONOCYTES # BLD: 0.7 K/UL (ref 0–1.3)
MONOCYTES NFR BLD: 7.1 %
NEUTROPHILS # BLD: 6.1 K/UL (ref 1.7–7.7)
NEUTROPHILS NFR BLD: 67.1 %
NITRITE UR QL STRIP.AUTO: NEGATIVE
PH UR STRIP.AUTO: 6 [PH] (ref 5–8)
PLATELET # BLD AUTO: 231 K/UL (ref 135–450)
PMV BLD AUTO: 7.9 FL (ref 5–10.5)
POTASSIUM SERPL-SCNC: 4 MMOL/L (ref 3.5–5.1)
PROT SERPL-MCNC: 7.1 G/DL (ref 6.4–8.2)
PROT UR STRIP.AUTO-MCNC: NEGATIVE MG/DL
RBC # BLD AUTO: 4.92 M/UL (ref 4.2–5.9)
RBC CLUMPS #/AREA URNS AUTO: 0 /HPF (ref 0–4)
SODIUM SERPL-SCNC: 141 MMOL/L (ref 136–145)
SP GR UR STRIP.AUTO: <=1.005 (ref 1–1.03)
UA COMPLETE W REFLEX CULTURE PNL UR: ABNORMAL
UA DIPSTICK W REFLEX MICRO PNL UR: YES
URN SPEC COLLECT METH UR: ABNORMAL
UROBILINOGEN UR STRIP-ACNC: 0.2 E.U./DL
WBC # BLD AUTO: 9.1 K/UL (ref 4–11)
WBC #/AREA URNS AUTO: 2 /HPF (ref 0–5)

## 2024-05-28 PROCEDURE — 80053 COMPREHEN METABOLIC PANEL: CPT

## 2024-05-28 PROCEDURE — 70450 CT HEAD/BRAIN W/O DYE: CPT

## 2024-05-28 PROCEDURE — 73030 X-RAY EXAM OF SHOULDER: CPT

## 2024-05-28 PROCEDURE — 96374 THER/PROPH/DIAG INJ IV PUSH: CPT

## 2024-05-28 PROCEDURE — 83735 ASSAY OF MAGNESIUM: CPT

## 2024-05-28 PROCEDURE — 6360000002 HC RX W HCPCS: Performed by: EMERGENCY MEDICINE

## 2024-05-28 PROCEDURE — 82077 ASSAY SPEC XCP UR&BREATH IA: CPT

## 2024-05-28 PROCEDURE — 81001 URINALYSIS AUTO W/SCOPE: CPT

## 2024-05-28 PROCEDURE — 85025 COMPLETE CBC W/AUTO DIFF WBC: CPT

## 2024-05-28 RX ORDER — PREDNISONE 10 MG/1
50 TABLET ORAL DAILY
Qty: 25 TABLET | Refills: 0 | Status: SHIPPED | OUTPATIENT
Start: 2024-05-28 | End: 2024-06-02

## 2024-05-28 RX ORDER — LIDOCAINE 50 MG/G
1 PATCH TOPICAL DAILY
Qty: 30 PATCH | Refills: 0 | Status: SHIPPED | OUTPATIENT
Start: 2024-05-28

## 2024-05-28 RX ORDER — MORPHINE SULFATE 4 MG/ML
4 INJECTION, SOLUTION INTRAMUSCULAR; INTRAVENOUS
Status: COMPLETED | OUTPATIENT
Start: 2024-05-28 | End: 2024-05-28

## 2024-05-28 RX ADMIN — MORPHINE SULFATE 4 MG: 4 INJECTION, SOLUTION INTRAMUSCULAR; INTRAVENOUS at 02:01

## 2024-05-28 ASSESSMENT — PAIN DESCRIPTION - LOCATION
LOCATION: SHOULDER
LOCATION: SHOULDER

## 2024-05-28 ASSESSMENT — PAIN DESCRIPTION - DESCRIPTORS: DESCRIPTORS: SHARP

## 2024-05-28 ASSESSMENT — PAIN DESCRIPTION - ORIENTATION
ORIENTATION: RIGHT
ORIENTATION: RIGHT

## 2024-05-28 ASSESSMENT — PAIN - FUNCTIONAL ASSESSMENT: PAIN_FUNCTIONAL_ASSESSMENT: 0-10

## 2024-05-28 ASSESSMENT — PAIN SCALES - GENERAL
PAINLEVEL_OUTOF10: 8
PAINLEVEL_OUTOF10: 8

## 2024-05-28 NOTE — ED NOTES
Discharge instructions given, patient acknowledged understanding, patient ambulated out of ed upon discharge

## 2024-05-28 NOTE — ED PROVIDER NOTES
[facility] EMERGENCY DEPARTMENT  EMERGENCY DEPARTMENT ENCOUNTER      Pt Name: Toan Greene  MRN: [id number]  Birthdate 1965  Date of evaluation: 5/27/2024  Provider: Yoko Gong MD    CHIEF COMPLAINT       Chief Complaint   Patient presents with    Fall    Seizures     Pt brought to ed by roger mireles from home after possibly having a seizure, per family pt was drinking all day and went outside ran up the street and when he returned he fell unresponsive to the ground, no incont noted, pt doesn't remember any of the events hx of seizures pt c/o  right should pain          HISTORY OF PRESENT ILLNESS   (Location/Symptom, Timing/Onset, Context/Setting, Quality, Duration, Modifying Factors, Severity)  Note limiting factors.   Toan Greene is a 58 y.o. male who presents to the emergency department after seizure.  He does report some right shoulder pain.  Pt has h/o sz. No meds for 3 years stating that he tran't had a sz for a year prior to that. Drinking alcohol today and was witnessed tonic clonic activity by family. Pt states headache. No weakness.  No nausea or vomiting.  No neck pain.  He does report the right shoulder pain but no chest pain or shortness of breath.  Pain in the right shoulder is worse with movement but he does have full range of motion      This patient is at risk for a communicable infection. Therefore, personal protection equipment consisting of a mask and gloves worn for the exam.     Nursing Notes were reviewed.    REVIEW OF SYSTEMS    (2-9 systems for level 4, 10 or more for level 5)     As per HPI    Except as noted above the remainder of the review of systems was reviewed and negative.       PAST MEDICAL HISTORY     Past Medical History:   Diagnosis Date    Acute MI (HCC)     2 years ago    CAD (coronary artery disease)     Seizures (HCC)          SURGICAL HISTORY       Past Surgical History:   Procedure Laterality Date    FRACTURE SURGERY      TYMPANOSTOMY

## 2024-05-28 NOTE — DISCHARGE INSTRUCTIONS
Do not drive or perform other activities where you or someone else may be harmed if you are suddenly lose consciousness/have seizure